# Patient Record
(demographics unavailable — no encounter records)

---

## 2024-10-14 NOTE — CARDIOLOGY SUMMARY
[de-identified] : 10/14/24 NSR, first degree AVB, LBBB 12/18/23 Sinus Rhythm @ 68 bpm, GUILLERMINA 244 ms, LBBB 8/7/2023 SR @ 72 bpm, first deg AVB, LBBB 10/17/22 low Atrial  Rhythm @ 65 bpm, LBBB  6/20/22 SR with first deg AVB, LBBB 12/13/21 SR with first degree AVB, LBBB [de-identified] : ZioXT 1/14 - 1/28/22: SR (49-), non sustained SVT. No AFib.  [de-identified] : TTE 9/10/24 1. Limited study obtained for evaluation of LVOT gradients.  2. Severe symmetric left ventricular hypertrophy.  3. Normal left ventricular systolic function.  4. Normal right ventricular size and systolic function.  5. Mild mitral regurgitation.  6. The resting LVOT gradient is 10.00 mmHg. The LVOT gradient is 17.00 mmHg with Valsalva maneuver. There is mild mitral regurgitation.  7. Pulmonary artery systolic pressure is 31 mmHg.  8. No pericardial effusion.  9. Compared to the previous TTE performed on 7/2/2024, the LVOT gradients are slightly higher.  TTE 4/2024  1. Normal left ventricular size and systolic function.  2. There is moderate asymmetric left ventricular hypertrophy.  3. Normal right ventricular size and systolic function.  4. Severely dilated left atrium.  5. Systolic anterior motion of the mitral valve is seen without evidence of LVOT obstruction.  6. Aortic sclerosis without significant stenosis.  7. No evidence of pulmonary hypertension.  8. No pericardial effusion.  9. Compared to the previous TTE performed on 11/17/2020, left ventricular outflow tract obstruction is now present on this study.  TTE 8/2023  Probably normal LV function, peak gradient 59 mmHg with valsalva, mild MR,    TTE 3/29/22  EF 60-65%, severe asymmetric LVH, cannot rule out LVOT obstruction versus AS, ascending aorta mildly dilated,  [de-identified] : CMR 4/20/21\par  1.  The left ventricle (LV) is dilated. There is  asymmetric septal hypertrophy. Basal anteroseptum measures 2.1 cm.  Left ventricular global systolic function is normal. The LV ejection fraction is 71 %.\par  2.  The right ventricle (RV) is dilated. RV global systolic function is normal. The RV ejection fraction is 66 %.\par  3.  There is systolic anterior motion of the mitral valve. There is increased signal intensity in the left ventricular outflow tract due to increased flow turbulence. Correlate gradients with echocardiography.\par  4.  Moderate to severe MR by phase contrast imaging. Regurgitant volume is 84 mL;  regurgitant fraction is 42% . There is mild mitral regurgitation by LV-RV stroke volume.\par  5.  No significant abnormalities of the visualized portions of the great vessels.\par  6.  On delayed enhancement imaging,  there is no evidence of myocardial scarring. [de-identified] : NM 9/2022: \par  No evidence of transthyretin mediated cardiac amyloidosis was identified.  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

## 2024-10-14 NOTE — DISCUSSION/SUMMARY
[FreeTextEntry1] : Mr. Posadas is a pleasant 70 year-old gentleman with a known LBBB, paroxysmal atrial fibrillation and asymmetric LVH. He is s/p PVI with the cryoballoon on 8/31/21.   1. AFib Maintaining SR post DCCV 3/2024 Continue Toprol XL for rate control  2. LBBB Stable.  Will continue to monitor   3. Stroke risk  Continue OAC for TE/CVA ppx  CHADS VASc at least 1   4. LVH No indication for ICD placement at this time  F/U 6 months, sooner as needed

## 2024-10-14 NOTE — CARDIOLOGY SUMMARY
[de-identified] : 10/14/24 NSR, first degree AVB, LBBB 12/18/23 Sinus Rhythm @ 68 bpm, GUILLERMINA 244 ms, LBBB 8/7/2023 SR @ 72 bpm, first deg AVB, LBBB 10/17/22 low Atrial  Rhythm @ 65 bpm, LBBB  6/20/22 SR with first deg AVB, LBBB 12/13/21 SR with first degree AVB, LBBB [de-identified] : ZioXT 1/14 - 1/28/22: SR (49-), non sustained SVT. No AFib.  [de-identified] : TTE 9/10/24 1. Limited study obtained for evaluation of LVOT gradients.  2. Severe symmetric left ventricular hypertrophy.  3. Normal left ventricular systolic function.  4. Normal right ventricular size and systolic function.  5. Mild mitral regurgitation.  6. The resting LVOT gradient is 10.00 mmHg. The LVOT gradient is 17.00 mmHg with Valsalva maneuver. There is mild mitral regurgitation.  7. Pulmonary artery systolic pressure is 31 mmHg.  8. No pericardial effusion.  9. Compared to the previous TTE performed on 7/2/2024, the LVOT gradients are slightly higher.  TTE 4/2024  1. Normal left ventricular size and systolic function.  2. There is moderate asymmetric left ventricular hypertrophy.  3. Normal right ventricular size and systolic function.  4. Severely dilated left atrium.  5. Systolic anterior motion of the mitral valve is seen without evidence of LVOT obstruction.  6. Aortic sclerosis without significant stenosis.  7. No evidence of pulmonary hypertension.  8. No pericardial effusion.  9. Compared to the previous TTE performed on 11/17/2020, left ventricular outflow tract obstruction is now present on this study.  TTE 8/2023  Probably normal LV function, peak gradient 59 mmHg with valsalva, mild MR,    TTE 3/29/22  EF 60-65%, severe asymmetric LVH, cannot rule out LVOT obstruction versus AS, ascending aorta mildly dilated,  [de-identified] : CMR 4/20/21\par  1.  The left ventricle (LV) is dilated. There is  asymmetric septal hypertrophy. Basal anteroseptum measures 2.1 cm.  Left ventricular global systolic function is normal. The LV ejection fraction is 71 %.\par  2.  The right ventricle (RV) is dilated. RV global systolic function is normal. The RV ejection fraction is 66 %.\par  3.  There is systolic anterior motion of the mitral valve. There is increased signal intensity in the left ventricular outflow tract due to increased flow turbulence. Correlate gradients with echocardiography.\par  4.  Moderate to severe MR by phase contrast imaging. Regurgitant volume is 84 mL;  regurgitant fraction is 42% . There is mild mitral regurgitation by LV-RV stroke volume.\par  5.  No significant abnormalities of the visualized portions of the great vessels.\par  6.  On delayed enhancement imaging,  there is no evidence of myocardial scarring. [de-identified] : NM 9/2022: \par  No evidence of transthyretin mediated cardiac amyloidosis was identified.  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

## 2024-10-14 NOTE — HISTORY OF PRESENT ILLNESS
[FreeTextEntry1] : Mr. Posadas is a pleasant 70 year-old gentleman with a past medical history significant for possible sleep apnea, HTN, known LBBB, paroxysmal atrial fibrillation (first diagnosed 8-10 years ago), and asymmetric LVH who presents for follow-up.  He was admitted to [facility] with symptomatic AFib with RVR 11/2020.   Echo showed WON and improved LVOT gradient.  He has been maintained on Metoprolol for rate control and Amiodarone was added for rhythm management.   Historically he has been unaware of arrhythmia events.  He is now s/p PVI with the cryoballoon on 8/31/21.    Amiodarone was discontinued in December 2021. LTM 11/2022 without recurrent AF.  He had recurrent symptomatic AF, now S/P DCCV to SR 11/17/23 and again on 3/22/24.  Interval history significant for diagnosis of NH Lymphoma.  S/P chemotherapy.  Had repeat CT last week and f/u at [facility] on Thursday.   He generally feels OK.  Denies CP, palpitations, dizziness, presyncope/syncope.  Tolerating Eliquis without bleeding issues.   Able to walk without difficulty.  Longstanding HOGUE with inclines is unchanged.  Cardiac MRI 3/2021 with no report of subaortic membrane

## 2024-10-14 NOTE — PHYSICAL EXAM
[Well Developed] : well developed [Well Nourished] : well nourished [No Acute Distress] : no acute distress [Normal Conjunctiva] : normal conjunctiva [Normal Venous Pressure] : normal venous pressure [No Carotid Bruit] : no carotid bruit [Normal S1, S2] : normal S1, S2 [No Murmur] : no murmur [No Rub] : no rub [No Gallop] : no gallop [5th Left ICS - MCL] : palpated at the 5th LICS in the midclavicular line [Normal] : normal [Normal Rate] : normal [Rhythm Regular] : regular [Normal S1] : normal S1 [Normal S2] : normal S2 [II] : a grade 2 [Clear Lung Fields] : clear lung fields [Good Air Entry] : good air entry [No Respiratory Distress] : no respiratory distress  [Soft] : abdomen soft [Non Tender] : non-tender [No Masses/organomegaly] : no masses/organomegaly [Normal Bowel Sounds] : normal bowel sounds [Normal Gait] : normal gait [No Edema] : no edema [No Cyanosis] : no cyanosis [No Clubbing] : no clubbing [No Varicosities] : no varicosities [No Rash] : no rash [No Skin Lesions] : no skin lesions [Moves all extremities] : moves all extremities [No Focal Deficits] : no focal deficits [Normal Speech] : normal speech [Alert and Oriented] : alert and oriented [Normal memory] : normal memory [General Appearance - Well Developed] : well developed [Normal Appearance] : normal appearance [Well Groomed] : well groomed [General Appearance - Well Nourished] : well nourished [No Deformities] : no deformities [General Appearance - In No Acute Distress] : no acute distress [Oriented To Time, Place, And Person] : oriented to person, place, and time [Affect] : the affect was normal [Mood] : the mood was normal [No Anxiety] : not feeling anxious

## 2024-10-14 NOTE — ADDENDUM
[FreeTextEntry1] : I, Steff Zazueta, am scribing for and the presence of Dr. Farias the following sections: HPI, PMH,Family/social history, ROS, Physical Exam, Assessment / Plan.   I, Adenike Farias, personally performed the services described in the documentation, reviewed the documentation recorded by the scribe in my presence and it accurately and completely records my words and actions.

## 2024-10-14 NOTE — HISTORY OF PRESENT ILLNESS
[FreeTextEntry1] : Mr. Posadas is a pleasant 70 year-old gentleman with a past medical history significant for possible sleep apnea, HTN, known LBBB, paroxysmal atrial fibrillation (first diagnosed 8-10 years ago), and asymmetric LVH who presents for follow-up.  He was admitted to [facility] with symptomatic AFib with RVR 11/2020.   Echo showed WON and improved LVOT gradient.  He has been maintained on Metoprolol for rate control and Amiodarone was added for rhythm management.   Historically he has been unaware of arrhythmia events.  He is now s/p PVI with the cryoballoon on 8/31/21.    Amiodarone was discontinued in December 2021. LTM 11/2022 without recurrent AF.  He had recurrent symptomatic AF, now S/P DCCV to SR 11/17/23 and again on 3/22/24.  Interval history significant for diagnosis of NH Lymphoma.  S/P chemotherapy.  Had repeat CT last week and f/u at [facility] on Thursday.   He generally feels OK.  Denies CP, palpitations, dizziness, presyncope/syncope.  Tolerating Eliquis without bleeding issues.   Able to walk without difficulty.  Longstanding HOGUE with inclines is unchanged.  Cardiac MRI 3/2021 with no report of subaortic membrane

## 2024-10-17 NOTE — ASSESSMENT
[FreeTextEntry1] : 70 year old male with past medical history significant for AFib, BPH, HLD, HTN, Hx of NH Lymphoma, SHAEN on CPAP.   Reports completing chemo therapy, follows with [facility].. Reports nightly compliance of CPAP - reaches 100% compliance nightly.  Has been on Trelegy, helps his breathing with all weather changes.  On exam, lungs are clear, no wheezing or rhonchi, in NAD.  Care plans discussed - will continue Trelegy (100mcg/62.5mcg/25 mcg) 1 puff Daily for management of SOB. Follow-up in - 3 months

## 2024-10-17 NOTE — HISTORY OF PRESENT ILLNESS
[TextBox_4] : Mr. GUSTABO ALBA is a 70 year old male with past medical history significant for AFib, BPH, HLD, HTN, Hx of NH Lymphoma

## 2024-11-07 NOTE — PHYSICAL EXAM
[Well Developed] : well developed [Well Nourished] : well nourished [No Acute Distress] : no acute distress [Obese] : obese [Normal Venous Pressure] : normal venous pressure [No Carotid Bruit] : no carotid bruit [Normal S1, S2] : normal S1, S2 [No Murmur] : no murmur [No Rub] : no rub [No Gallop] : no gallop [Clear Lung Fields] : clear lung fields [Good Air Entry] : good air entry [No Respiratory Distress] : no respiratory distress  [Normal Gait] : normal gait [No Edema] : no edema [No Cyanosis] : no cyanosis [No Clubbing] : no clubbing [Moves all extremities] : moves all extremities [No Focal Deficits] : no focal deficits [Normal Speech] : normal speech [Normal] : alert and oriented, normal memory [Alert and Oriented] : alert and oriented [Normal memory] : normal memory [Normal Oral Mucosa] : normal oral mucosa [No Oral Pallor] : no oral pallor [No Oral Cyanosis] : no oral cyanosis [Normal Jugular Venous A Waves Present] : normal jugular venous A waves present [Normal Jugular Venous V Waves Present] : normal jugular venous V waves present [No Jugular Venous Cool A Waves] : no jugular venous cool A waves [Heart Rate And Rhythm] : heart rate and rhythm were normal [Heart Sounds] : normal S1 and S2 [Murmurs] : no murmurs present [Respiration, Rhythm And Depth] : normal respiratory rhythm and effort [Exaggerated Use Of Accessory Muscles For Inspiration] : no accessory muscle use [Auscultation Breath Sounds / Voice Sounds] : lungs were clear to auscultation bilaterally [Abdomen Soft] : soft [Abdomen Tenderness] : non-tender [Abdomen Mass (___ Cm)] : no abdominal mass palpated [Nail Clubbing] : no clubbing of the fingernails [Cyanosis, Localized] : no localized cyanosis [Petechial Hemorrhages (___cm)] : no petechial hemorrhages [] : no ischemic changes

## 2024-11-07 NOTE — REVIEW OF SYSTEMS
[Feeling Fatigued] : feeling fatigued [Dyspnea on exertion] : dyspnea during exertion [Joint Pain] : joint pain

## 2024-11-12 NOTE — ASSESSMENT
[FreeTextEntry1] : 70 year old male with past medical history significant for AFib, BPH, HLD, HTN, Hx of NH Lymphoma, SHANE on CPAP.  Reports following with [facility] for Lymphoma, after recent blood work his team there has decided to do full workup to assess for possible recurrence of lymphoma.  . Reports nightly compliance of CPAP - reaches 100% compliance nightly.   Has been on Trelegy, helps his breathing with all weather changes. Recently suffering from increase and congestion and nasal discahrge.   On exam, lungs are clear, no wheezing or rhonchi, in NAD. No evidence of post nasal drip or pharyngitis. Care plans discussed - will continue Trelegy (100mcg/62.5mcg/25 mcg) 1 puff Daily for management of SOB, recommend Afrin/Nasal spray for nasal congestion. Follow-up in - 3 months

## 2024-11-13 NOTE — HISTORY OF PRESENT ILLNESS
[FreeTextEntry1] : RPA spots  [de-identified] : 70M PMHx Non-Hodgkin's lymphoma on chemotherapy last seen presents for  LV may 2024 for fbse  1. Paronychia on second fingernail of left hand. previously PO abx and recurrence, was using Triamcinolone and tacro on it. has not flared since LV 6 months ago 2. Psoriasis for 25+ years - using tacrolimus 0.1% ointment qhs as needed for underarms and groin. Has also used topical steroids in the past. No joint pains.  3. 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

## 2024-11-13 NOTE — PHYSICAL EXAM
[Alert] : alert [Oriented x 3] : ~L oriented x 3 [Well Nourished] : well nourished [Conjunctiva Non-injected] : conjunctiva non-injected [No Visual Lymphadenopathy] : no visual  lymphadenopathy [No Clubbing] : no clubbing [No Edema] : no edema [No Bromhidrosis] : no bromhidrosis [No Chromhidrosis] : no chromhidrosis [Declined] : declined [FreeTextEntry3] : Focused skin exam per pt preference stuck on brown papules R hand skin colored firm papule with stellate scar on L wrist pink gritty papule on the right antihelix and L temple pink well-demarcated scaly plaque on the left axilla and on the inner thighs  fingers clear today xerosis on lower legs

## 2024-11-13 NOTE — ASSESSMENT
[FreeTextEntry1] : #Psoriasis, scalp, axillae, groin - chronic; stable - Diagnosis, chronic nature, disease course, treatment options and goals of therapy discussed - cont tacrolimus 0.1% ointment to the affected areas twice a day, SED  #Actinic keratosis, R antihelix x1, L temple x 1 - Treated 2 lesion w/ LN2 X 2 cycles (round 2 on R helix) - The risks/benefits/alternatives of cryo-destruction was explained to the patient which include but are not limited to redness, pain, blistering, scar, discoloration of skin, and recurrence. The patient expressed understanding of these risks and agreed to the procedure. The procedure was well tolerated, without complication. We have discussed related skin care.   #Chronic paronychia, 2nd fingernail of L hand - Chronic; stable - Stable, tacro PRN   #Spots SK, R hand DF, L wrist - Reassured, benign  #Xerosis Cutis - Take warm water baths. Avoid hot showers - Moisturize whole body 2-3x daily with thick cream (rather than lotion). Examples include OTC La Roche Posay, Vanicream, Cetaphil, Cerave  TBSE due 5/2025

## 2024-11-13 NOTE — HISTORY OF PRESENT ILLNESS
[FreeTextEntry1] : RPA spots  [de-identified] : 70M PMHx Non-Hodgkin's lymphoma on chemotherapy last seen presents for  LV may 2024 for fbse  1. Paronychia on second fingernail of left hand. previously PO abx and recurrence, was using Triamcinolone and tacro on it. has not flared since LV 6 months ago 2. Psoriasis for 25+ years - using tacrolimus 0.1% ointment qhs as needed for underarms and groin. Has also used topical steroids in the past. No joint pains.  3. 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

## 2024-11-25 NOTE — PHYSICAL EXAM
[Well Developed] : well developed [Well Nourished] : well nourished [No Acute Distress] : no acute distress [Obese] : obese [Normal Venous Pressure] : normal venous pressure [No Carotid Bruit] : no carotid bruit [Normal S1, S2] : normal S1, S2 [No Murmur] : no murmur [No Rub] : no rub [No Gallop] : no gallop [Clear Lung Fields] : clear lung fields [Good Air Entry] : good air entry [No Respiratory Distress] : no respiratory distress  [Normal Gait] : normal gait [No Edema] : no edema [No Cyanosis] : no cyanosis [No Clubbing] : no clubbing [Moves all extremities] : moves all extremities [No Focal Deficits] : no focal deficits [Normal Speech] : normal speech [Normal] : alert and oriented, normal memory [Alert and Oriented] : alert and oriented [Normal memory] : normal memory [Normal Oral Mucosa] : normal oral mucosa [No Oral Pallor] : no oral pallor [No Oral Cyanosis] : no oral cyanosis [Normal Jugular Venous A Waves Present] : normal jugular venous A waves present [Normal Jugular Venous V Waves Present] : normal jugular venous V waves present [No Jugular Venous Cool A Waves] : no jugular venous cool A waves [Heart Rate And Rhythm] : heart rate and rhythm were normal [Heart Sounds] : normal S1 and S2 [Murmurs] : no murmurs present [Respiration, Rhythm And Depth] : normal respiratory rhythm and effort [Exaggerated Use Of Accessory Muscles For Inspiration] : no accessory muscle use [Auscultation Breath Sounds / Voice Sounds] : lungs were clear to auscultation bilaterally [Abdomen Soft] : soft [Abdomen Tenderness] : non-tender [Abdomen Mass (___ Cm)] : no abdominal mass palpated [Nail Clubbing] : no clubbing of the fingernails [Petechial Hemorrhages (___cm)] : no petechial hemorrhages [Cyanosis, Localized] : no localized cyanosis [] : no ischemic changes [FreeTextEntry1] : obese, nad

## 2024-11-25 NOTE — HISTORY OF PRESENT ILLNESS
[FreeTextEntry1] : GUSTABO ALBA is a 70 year y.o. M with medical history significant for SHANE on CPA, AF s/p PVI w/ cryoballoon (21- off Amiodarone since 2021), hypertrophic cardiomyopathy (basal anteroseptum 2.1 cm) with improved gradient (on mavacamten), NYHA II symptoms, he developed recurrent symptomatic AF and underwent DCCV (2023), remains on AC, LBBB, prediabetes. He is here for c/o 2 episodes of PAF during the past week.  He has been diagnosed with duodenal NH-Lymphoma (2024), he completed chemo.   He is s/p EGD, biopsy taken from the stomach mucosa and from duodenum. Results are normal.  No cardiac complications.  No new cardiac complaints at this time. He is in AF today.  He remains compensated. BP is stable.   He has chronic limited functional capacity.  He is on high dose BB.  He is followed by HCM specialist (Dr Diego). He has been on mavacamten with serial echo monitoring. LVOT gradient improved.  He recently had an EVA. Achieved 4.6 METS. Normal EF. No LVOT gradient.       Denies CP, SOB (at rest), palpitations, orthopnea, dizziness, syncope, LH  He can walk 8 blocks on flat surface and can climb 1 flight of stairs.  Sleeps with 1-2 pillows   He denies history of MI, CVA, DM.  Denies family history of premature ASCVD and /or SCD  Cardiac meds  Metoprolol XL 150mg am and 100 mg pm Eliquis 5 mg Atorvastatin 20 mg Camzyos 5 mg   DATA LABS (24): Hgb 14; Hct 42.7; Plt 220; Cre 0.62; K 4.2; LFTs wnl; eGR 103; ; ; LDL 85; Hdl 41; A1c 5.7;  LABS (3/4/24): A1C 6.1, K 4.4, CRE 0.51, LFT's wnl, eGFR 109, , , LDL 91, NON-, TSH 2.85, HGB 15.5, HCT 46.0. LABS (10/23/23): ; ; HDL 43; LDL 87; NonHDL 111; Cre 0.58; K 4.7; LFTs wnl; eGFR 106;  LABS (23): A1c 5.8; ; ; LDL 87; HDL 45; NonHDL 111; K 4.3; Cre 0.63; eGFR 103; LFTs wnl;  LABS (23): ; ; LDL 81; HDL 46; NonHDL 116; K 4.2; Cre 0.58; LFTs wnl; eGFR 106; A1c 6.1;   (11/10/22): Hgb 14.7; Hct 45.1; ESR 34; K 4.5; Cre 0.52; LFTs wnl; eGFR 110; CRP 15;  (10/10/22): proBNP 453.  (22): A1c 6.1 ; TSH 2.26; Cre 0.72; K 4.5; LFTs wnl; eGFR 100; Hgb 14.8; Hct 49.4 (22): K 3.9; Cre 0.64; eGFR 103; LFTs wnl  (3/20/22): ; ; LDL 87; HDL 42; NonHDL 111; K 4.1; Cre 0.63; LFTs wnl; eGFR 104; A1c 5.8  MPI (22): is normal. Overall LVEF is preserved w/o regional wma. EF 73%. EKG is nondiagnostic  NM Amyloid SPECT/CT (10/3/22): No evidence of transthyretin mediated cardiac amyloidosis was identified. Incidental note is made of degenerative disease in the lower thoracic spine.   CMR (21): Dilated LV. Asymmetric septal hypertrophy (2.1 cm). LVEF 71%. RVEF 66%. Mild AR. Anterior MV leaflet thickened. WON of MV. There is increased signal intensity c/w turbulent flow in the LVOT. No AS. Mild AR. Mod- severe MR. Regurg Vol 84 ml, Regurg Fraction 43%. Mild MR by LV-RV stroke volume. No MS. Dilated LA. LA Vol 92 ml. RA dilated. No significant abnormalities of the visualized portions of the great vessels. On delayed enhancement imaging. There is no evidence of myocardial scarring.   EVA (24): Ex 7:26 min to 4.6 METS. LVEF 55-60%. No LVOT gradient at rest or with exertion.   ECHO (24): Mod to severe symmetric LVH. LVEF 58%. Mildly dilated LA.  ECHO (): Nl LV size and function. EF 60%. There is moderate asymmetric LVH. Severely dilated LA. WON of the MV is seen w.o evidence of LVOT obstruction. aortic sclerosis w.o stenosis. Compared with prior TTE (20) the LVOT obstruction is now present on this study.  Echo: TTE 10/2023: no LVOT obstruction  ECHO (23): Mild concentric LVH, probably normal LV systolic function. Increased peak LVOT gradient (59 mmHg) noted with Valsalva likely due to mild systolic motion of the anterior mitral leaflet.   ECHO (23): Limited study. Severe symmetric LVH, Aortic sclerosis w/o significant AS. Chordal WON present. Mild MR. There is no evidence of LVOT obstruction. Peak LVOT gradient 7 mmHg at rest and 6 mmHg with valsalva both while supine and standing . IVSd 1.71 cm ; LVPW 1.68 cm.   ECHO (23) EF 65%. WON No significant increase in LVOT gradient. Grade IDD. LA is mildly dilated. Mild MR. Mild-Mod TR. Mild PAH.   ECHO (3/30/22): Technically difficult study. LVEF 60-65%. Severe asymmetric LVH (septal wall 1.9cm; LVPW 1.5cm). Cannot r/o LVOT obstruction vs AS. PV 3.3 m/s; AV MG 23 mmHg; AV PG 43 mmHg; DVI 0.63; TERRI 1.9 cm2. Mild MR. PASP 35 mmHg. Asc Ao is mildly dilated 4 cm.   ECHO : severe asymmetric septal hypertrophy with significant LVOT obstruction peak gradient of 101 mmHg across  the LVOT   ECG (24): AF at 98 bpm w axis -41, LBBB ECG (24): NSR at 68 bpm w left axis (-37), 1st degree AVB (Brannon 248 ms) and LBBB ECG (24): NSR at 79 bpm w left axis (-38), LBBB, LAFB , first degree AVB (Brannon 240 ms) ECG (24): NSR at 69 bpm w axis -31, 1st degree AVB (Brannon 248 ms) and LBBB ECG (23): NSR at 67 bpm w axis -35, 1st degree AVB (Brannon = 250ms) and LBBB ECG (23): NSR at 59 bpm w nl axis, 1st degree AVB (Brannon 254 ms) and LBBB ECG (22): NSR at 67 bpm with normal axis, 1st degree AVB (EI=628 ms) and LBBB EKG 2022 SR, 1st ABV Brannon 234, LBBB EKG 3/29/2022 Sinus rhythm, left bundle branch block EKG 2021 SR, LBBB EKG: 3/8/21 SR @ 55 bpm, LBBB  EKG 2021: SR, LBBB, 1st AVB ( Brannon 256 msec)  EC21 SR with first degree AVB, LBBB   Holter (10/12/23): Min 50 bpm, Avg 72 bpm, Max 102 bpm. NSR w NSVT x 1 (7 beats long), PAT (longest 6 beats) Holter x 14d (22): Min 49 bpm, Avg 64 bpm, max 124 bpm. Sinus rhythm, brief PSVT x 7 (longest was 6 beats)

## 2024-11-25 NOTE — HISTORY OF PRESENT ILLNESS
[FreeTextEntry1] : GUSTABO ALBA is a 70 year y.o. M with medical history significant for SHANE on CPA, AF s/p PVI w/ cryoballoon (21- off Amiodarone since 2021), hypertrophic cardiomyopathy (basal anteroseptum 2.1 cm) with improved gradient (on mavacamten), NYHA II symptoms, he developed recurrent symptomatic AF and underwent DCCV (2023), remains on AC, LBBB, prediabetes. He is here for c/o 2 episodes of PAF during the past week.  He has been diagnosed with duodenal NH-Lymphoma (2024), he completed chemo.   He is s/p EGD, biopsy taken from the stomach mucosa and from duodenum. Results are normal.  No cardiac complications.  No new cardiac complaints at this time. He is in AF today.  He remains compensated. BP is stable.   He has chronic limited functional capacity.  He is on high dose BB.  He is followed by HCM specialist (Dr Diego). He has been on mavacamten with serial echo monitoring. LVOT gradient improved.  He recently had an EVA. Achieved 4.6 METS. Normal EF. No LVOT gradient.       Denies CP, SOB (at rest), palpitations, orthopnea, dizziness, syncope, LH  He can walk 8 blocks on flat surface and can climb 1 flight of stairs.  Sleeps with 1-2 pillows   He denies history of MI, CVA, DM.  Denies family history of premature ASCVD and /or SCD  Cardiac meds  Metoprolol XL 150mg am and 100 mg pm Eliquis 5 mg Atorvastatin 20 mg Camzyos 5 mg   DATA LABS (24): Hgb 14; Hct 42.7; Plt 220; Cre 0.62; K 4.2; LFTs wnl; eGR 103; ; ; LDL 85; Hdl 41; A1c 5.7;  LABS (3/4/24): A1C 6.1, K 4.4, CRE 0.51, LFT's wnl, eGFR 109, , , LDL 91, NON-, TSH 2.85, HGB 15.5, HCT 46.0. LABS (10/23/23): ; ; HDL 43; LDL 87; NonHDL 111; Cre 0.58; K 4.7; LFTs wnl; eGFR 106;  LABS (23): A1c 5.8; ; ; LDL 87; HDL 45; NonHDL 111; K 4.3; Cre 0.63; eGFR 103; LFTs wnl;  LABS (23): ; ; LDL 81; HDL 46; NonHDL 116; K 4.2; Cre 0.58; LFTs wnl; eGFR 106; A1c 6.1;   (11/10/22): Hgb 14.7; Hct 45.1; ESR 34; K 4.5; Cre 0.52; LFTs wnl; eGFR 110; CRP 15;  (10/10/22): proBNP 453.  (22): A1c 6.1 ; TSH 2.26; Cre 0.72; K 4.5; LFTs wnl; eGFR 100; Hgb 14.8; Hct 49.4 (22): K 3.9; Cre 0.64; eGFR 103; LFTs wnl  (3/20/22): ; ; LDL 87; HDL 42; NonHDL 111; K 4.1; Cre 0.63; LFTs wnl; eGFR 104; A1c 5.8  MPI (22): is normal. Overall LVEF is preserved w/o regional wma. EF 73%. EKG is nondiagnostic  NM Amyloid SPECT/CT (10/3/22): No evidence of transthyretin mediated cardiac amyloidosis was identified. Incidental note is made of degenerative disease in the lower thoracic spine.   CMR (21): Dilated LV. Asymmetric septal hypertrophy (2.1 cm). LVEF 71%. RVEF 66%. Mild AR. Anterior MV leaflet thickened. WON of MV. There is increased signal intensity c/w turbulent flow in the LVOT. No AS. Mild AR. Mod- severe MR. Regurg Vol 84 ml, Regurg Fraction 43%. Mild MR by LV-RV stroke volume. No MS. Dilated LA. LA Vol 92 ml. RA dilated. No significant abnormalities of the visualized portions of the great vessels. On delayed enhancement imaging. There is no evidence of myocardial scarring.   EVA (24): Ex 7:26 min to 4.6 METS. LVEF 55-60%. No LVOT gradient at rest or with exertion.   ECHO (24): Mod to severe symmetric LVH. LVEF 58%. Mildly dilated LA.  ECHO (): Nl LV size and function. EF 60%. There is moderate asymmetric LVH. Severely dilated LA. WON of the MV is seen w.o evidence of LVOT obstruction. aortic sclerosis w.o stenosis. Compared with prior TTE (20) the LVOT obstruction is now present on this study.  Echo: TTE 10/2023: no LVOT obstruction  ECHO (23): Mild concentric LVH, probably normal LV systolic function. Increased peak LVOT gradient (59 mmHg) noted with Valsalva likely due to mild systolic motion of the anterior mitral leaflet.   ECHO (23): Limited study. Severe symmetric LVH, Aortic sclerosis w/o significant AS. Chordal WON present. Mild MR. There is no evidence of LVOT obstruction. Peak LVOT gradient 7 mmHg at rest and 6 mmHg with valsalva both while supine and standing . IVSd 1.71 cm ; LVPW 1.68 cm.   ECHO (23) EF 65%. WON No significant increase in LVOT gradient. Grade IDD. LA is mildly dilated. Mild MR. Mild-Mod TR. Mild PAH.   ECHO (3/30/22): Technically difficult study. LVEF 60-65%. Severe asymmetric LVH (septal wall 1.9cm; LVPW 1.5cm). Cannot r/o LVOT obstruction vs AS. PV 3.3 m/s; AV MG 23 mmHg; AV PG 43 mmHg; DVI 0.63; TERRI 1.9 cm2. Mild MR. PASP 35 mmHg. Asc Ao is mildly dilated 4 cm.   ECHO : severe asymmetric septal hypertrophy with significant LVOT obstruction peak gradient of 101 mmHg across  the LVOT   ECG (24): AF at 98 bpm w axis -41, LBBB ECG (24): NSR at 68 bpm w left axis (-37), 1st degree AVB (Brannon 248 ms) and LBBB ECG (24): NSR at 79 bpm w left axis (-38), LBBB, LAFB , first degree AVB (Brannon 240 ms) ECG (24): NSR at 69 bpm w axis -31, 1st degree AVB (Brannon 248 ms) and LBBB ECG (23): NSR at 67 bpm w axis -35, 1st degree AVB (Brannon = 250ms) and LBBB ECG (23): NSR at 59 bpm w nl axis, 1st degree AVB (Brannon 254 ms) and LBBB ECG (22): NSR at 67 bpm with normal axis, 1st degree AVB (OI=515 ms) and LBBB EKG 2022 SR, 1st ABV Brannon 234, LBBB EKG 3/29/2022 Sinus rhythm, left bundle branch block EKG 2021 SR, LBBB EKG: 3/8/21 SR @ 55 bpm, LBBB  EKG 2021: SR, LBBB, 1st AVB ( Brannon 256 msec)  EC21 SR with first degree AVB, LBBB   Holter (10/12/23): Min 50 bpm, Avg 72 bpm, Max 102 bpm. NSR w NSVT x 1 (7 beats long), PAT (longest 6 beats) Holter x 14d (22): Min 49 bpm, Avg 64 bpm, max 124 bpm. Sinus rhythm, brief PSVT x 7 (longest was 6 beats)

## 2024-11-25 NOTE — ASSESSMENT
[FreeTextEntry1] : 70 year-old M with a past medical history significant, HTN (controlled), known LBBB, paroxysmal atrial fibrillation status post ablation (8/31/21 - on AC), NYHA II and hypertrophic cardiomyopathy w WON and high LVOT gradient (on metoprolol and mavacamten), duodenal Non-Hodgkins Lymphoma s/p chemo. he is s/p EGD with biopsy that is normal. No cardiac complications. He is overall doing well. He is in his usual chronic state of health.   - toprol xl from 150 mg in am and 100mg in pm to 150mg BID - he will hold eliquis for 48 hr prior to procedure and resume as soon as possible  - HCM --> no indications for primary prevention ICD given lack of NSVT on Holter, no first degree family history of SCD, MWT < 3.0 cm, and no significant fibrosis on MRI - continue taking current cardiac meds (atorvastatin, eliquis, metoprolol, mavacamten). - recc to avoid dehydration - continue with regular echo monitoring. - He follows regularly with Dr. Diego  Patient advised to go to the nearest ED whenever any symptoms persist and/or worsens. Patient/Family/Caregiver verbalized complete understanding of these instructions.   I spent a total of 25 minutes with more than 50% spent on counseling and coordinating care.   RTO in 4 mo.

## 2024-11-25 NOTE — REVIEW OF SYSTEMS
[Feeling Fatigued] : feeling fatigued [Dyspnea on exertion] : dyspnea during exertion [Joint Pain] : joint pain [Fever] : no fever [Headache] : no headache [Chills] : no chills [SOB] : no shortness of breath [Chest Discomfort] : no chest discomfort [Lower Ext Edema] : no extremity edema [Leg Claudication] : no intermittent leg claudication [Palpitations] : no palpitations [Orthopnea] : no orthopnea [PND] : no PND [Syncope] : no syncope [Cough] : no cough [Wheezing] : no wheezing [Coughing Up Blood] : no hemoptysis [Dizziness] : no dizziness [Convulsions] : no convulsions [Limb Weakness (Paresis)] : no limb weakness (Paresis) [Confusion] : no confusion was observed [Under Stress] : not under stress [Easy Bleeding] : no tendency for easy bleeding

## 2024-11-25 NOTE — PHYSICAL EXAM
[Well Developed] : well developed [Well Nourished] : well nourished [No Acute Distress] : no acute distress [Obese] : obese [Normal Venous Pressure] : normal venous pressure [No Carotid Bruit] : no carotid bruit [Normal S1, S2] : normal S1, S2 [No Murmur] : no murmur [No Rub] : no rub [No Gallop] : no gallop [Clear Lung Fields] : clear lung fields [Good Air Entry] : good air entry [No Respiratory Distress] : no respiratory distress  [Normal Gait] : normal gait [No Edema] : no edema [No Cyanosis] : no cyanosis [No Clubbing] : no clubbing [Moves all extremities] : moves all extremities [No Focal Deficits] : no focal deficits [Normal Speech] : normal speech [Normal] : alert and oriented, normal memory [Alert and Oriented] : alert and oriented [Normal memory] : normal memory [Normal Oral Mucosa] : normal oral mucosa [No Oral Pallor] : no oral pallor [No Oral Cyanosis] : no oral cyanosis [Normal Jugular Venous A Waves Present] : normal jugular venous A waves present [Normal Jugular Venous V Waves Present] : normal jugular venous V waves present [No Jugular Venous Cool A Waves] : no jugular venous cool A waves [Heart Rate And Rhythm] : heart rate and rhythm were normal [Heart Sounds] : normal S1 and S2 [Murmurs] : no murmurs present [Respiration, Rhythm And Depth] : normal respiratory rhythm and effort [Exaggerated Use Of Accessory Muscles For Inspiration] : no accessory muscle use [Auscultation Breath Sounds / Voice Sounds] : lungs were clear to auscultation bilaterally [Abdomen Soft] : soft [Abdomen Tenderness] : non-tender [Nail Clubbing] : no clubbing of the fingernails [Abdomen Mass (___ Cm)] : no abdominal mass palpated [Petechial Hemorrhages (___cm)] : no petechial hemorrhages [Cyanosis, Localized] : no localized cyanosis [] : no ischemic changes [FreeTextEntry1] : obese, nad

## 2024-11-26 NOTE — HISTORY OF PRESENT ILLNESS
[FreeTextEntry1] : Referring: Dr. Beata Cavanaugh EP: Dr. Farias  Mr. Posadas is a 69 YO M with a history of hypertrophic obstructive cardiomyopathy, recurrent persistent AF s/p ablation 2021 and DCCV 11/2023 and 3/2024, HTN, and duodenal lymphoma on chemotherapy presenting to cardiomyopathy clinic for further management.  ========  He states he diagnosed with HCM in the 1990s in the setting of fatigue at [facility]. It became apparent in the [facility] system in 2020 in the setting of rapid AF where TTE revealed basal septal hypertrophy with LVOT obstruction. He spontaneously converted to NSR and underwent PVI ablation 8/2021. He developed worsening dyspnea on exertion with LVOT obstruction and referred to the cardiomyopathy clinic 10/2022 for further management. Denies lifetime history of syncope.  Due to persistent NYHA II symptoms on max traditional medical therapy he was started on mavacamten (Camzyos) on 5/2023 and doses have been titrated. He no longer has LVOT obstruction but does have persistent NYHA II symptoms. He developed recurrent symptomatic AF and underwent DCCV 11/2023 and again 3/2024. His AF recurred 11/2024   Of note, he had an EGD performed for GERD which revealed lymphoma of the duodenum and started on chemotherapy in April  ============  He presents today for followup. He still does note some dyspnea when going up an incline but can walk unlimited distances on a flat surface. Notes occasional dizziness when standing up quickly. Denies palpitations or chest pain. Denies dyspnea during household activities. Denies syncope.

## 2024-11-26 NOTE — DISCUSSION/SUMMARY
[FreeTextEntry1] : # Hypertrophic obstructive cardiomyopathy - continue mavacamten 5 mg daily, will continue with routine screening TTE as indicated (last 9/2024) - continue high dose metoprolol, dose per Dr. Cavanaugh (currently at 300 mg a day) - indications for septal reduction therapy: none given resolution of LVOT obstruction  - last Holter or device interrogation: 10/2023, will obtain 3 day Ziopatch today  - no indications for primary prevention ICD given lack of NSVT on Holter, no first degree family history of SCD, MWT < 3.0 cm, and no significant fibrosis on MRI - family screening and genetic testing: advised his brother be screened, previously referred to Dr. Chávez for genetic testing - advised against burst activity and competitive sports. advised increased hydration at home  # pAF s/p ablation 2021 and multiple cardioversions  - EKG today shows SR but has been paroxysmal since last DCCV. 72 hour Ziopatch ordered today will also quantify AF burden better  - Follows with Dr. Farias - On eliquis 5 mg BID  # HTN - will allow for some permissive HTN in setting of HCM  # GI lymphoma - receiving chemotherapy, reportedly with good prognosis  Return to clinic in 6 months

## 2024-11-26 NOTE — ASSESSMENT
[FreeTextEntry1] : 71 YO M with a history of hypertrophic obstructive cardiomyopathy, recurrent persistent AF s/p ablation 2021 and DCCV 11/2023 and 3/2024, HTN, and duodenal lymphoma on chemotherapy presenting to cardiomyopathy clinic for further management.  He has had improvement in symptoms and resolution of LVOT obstruction with mavacamten but still notes dyspnea when climbing an incline.

## 2024-11-26 NOTE — CARDIOLOGY SUMMARY
[de-identified] : EKG 9/1: SR with 1st degree AVB (), LBBB with  EKG 10/2022: SR, LBBB with  ms, left axis deviation   [de-identified] : TTE 10/2023: no LVOT obstruction  TTE 8/30/2023 (on Camzyos 2.5): LV 3.6 cm, IV septum 1.4 cm, LVEF 60-65%, LVOT gradient 59 mmHg with Valsalva   TTE 5/2023: LVEF 65%, WON with turbulence and while no LVOT gradient was measured suspect there is LVOTO  TTE 11/2022: LV 4.9 cm, LVEF 69%,  severe basal septal hypertrophy measuring 1.7 cm, LVEF hyperdynamic, + WON, LVOT obstruction with 45 mmHg gradient with Valsalva, mild MR, estimated PASP 38 mmHg  TTE 3/2022 (poor quality study due to echo machine): LV 4.9 cm, LVEF hyperdynamic, severe basal septal hypertrophy measuring 1.9 cm, + WON with turbulence across LVOT tract, minimal MR, peak gradient measured at 22 mmHg with CW Doppler but there is a Pedoff probe measurement of 51 mmHg that does appear late peaking so suspect this is LVOT  TTE 11/2020: LV 5.7 cm, LVEF 60-65%, severe asymmetric septal hypertrophy with peak gradient 101 mmHg   [de-identified] : \par  Cardiac MR 4/2021: max wall thickness 2.1 cm, LVEF 71%, + WON, mod-severe MR seen on phase contrast imaging but mild using stroke volumes, no LGE\par   [de-identified] : \par  NM amyloidosis: negative for TTR amyloid\par

## 2024-11-26 NOTE — HISTORY OF PRESENT ILLNESS
[FreeTextEntry1] : Referring: Dr. Beata Cavanaugh EP: Dr. Farias  Mr. Posadas is a 71 YO M with a history of hypertrophic obstructive cardiomyopathy, recurrent persistent AF s/p ablation 2021 and DCCV 11/2023 and 3/2024, HTN, and duodenal lymphoma on chemotherapy presenting to cardiomyopathy clinic for further management.  ========  He states he diagnosed with HCM in the 1990s in the setting of fatigue at [facility]. It became apparent in the [facility] system in 2020 in the setting of rapid AF where TTE revealed basal septal hypertrophy with LVOT obstruction. He spontaneously converted to NSR and underwent PVI ablation 8/2021. He developed worsening dyspnea on exertion with LVOT obstruction and referred to the cardiomyopathy clinic 10/2022 for further management. Denies lifetime history of syncope.  Due to persistent NYHA II symptoms on max traditional medical therapy he was started on mavacamten (Camzyos) on 5/2023 and doses have been titrated. He no longer has LVOT obstruction but does have persistent NYHA II symptoms. He developed recurrent symptomatic AF and underwent DCCV 11/2023 and again 3/2024. His AF recurred 11/2024   Of note, he had an EGD performed for GERD which revealed lymphoma of the duodenum and started on chemotherapy in April  ============  He presents today for followup. He still does note some dyspnea when going up an incline but can walk unlimited distances on a flat surface. Notes occasional dizziness when standing up quickly. Denies palpitations or chest pain. Denies dyspnea during household activities. Denies syncope.

## 2024-11-26 NOTE — CARDIOLOGY SUMMARY
[de-identified] : EKG 9/1: SR with 1st degree AVB (), LBBB with  EKG 10/2022: SR, LBBB with  ms, left axis deviation   [de-identified] : TTE 10/2023: no LVOT obstruction  TTE 8/30/2023 (on Camzyos 2.5): LV 3.6 cm, IV septum 1.4 cm, LVEF 60-65%, LVOT gradient 59 mmHg with Valsalva   TTE 5/2023: LVEF 65%, WON with turbulence and while no LVOT gradient was measured suspect there is LVOTO  TTE 11/2022: LV 4.9 cm, LVEF 69%,  severe basal septal hypertrophy measuring 1.7 cm, LVEF hyperdynamic, + WON, LVOT obstruction with 45 mmHg gradient with Valsalva, mild MR, estimated PASP 38 mmHg  TTE 3/2022 (poor quality study due to echo machine): LV 4.9 cm, LVEF hyperdynamic, severe basal septal hypertrophy measuring 1.9 cm, + WON with turbulence across LVOT tract, minimal MR, peak gradient measured at 22 mmHg with CW Doppler but there is a Pedoff probe measurement of 51 mmHg that does appear late peaking so suspect this is LVOT  TTE 11/2020: LV 5.7 cm, LVEF 60-65%, severe asymmetric septal hypertrophy with peak gradient 101 mmHg   [de-identified] : \par  Cardiac MR 4/2021: max wall thickness 2.1 cm, LVEF 71%, + WON, mod-severe MR seen on phase contrast imaging but mild using stroke volumes, no LGE\par   [de-identified] : \par  NM amyloidosis: negative for TTR amyloid\par

## 2024-11-26 NOTE — PHYSICAL EXAM
[Well Developed] : well developed [Well Nourished] : well nourished [Normal Venous Pressure] : normal venous pressure [Normal S1, S2] : normal S1, S2 [Clear Lung Fields] : clear lung fields [Good Air Entry] : good air entry [Soft] : abdomen soft [Normal Gait] : normal gait [No Edema] : no edema [Moves all extremities] : moves all extremities [No Focal Deficits] : no focal deficits [Alert and Oriented] : alert and oriented [Normal memory] : normal memory [de-identified] : no murmur at rest or with [id number] provocation  [de-identified] : Warm

## 2024-11-26 NOTE — PHYSICAL EXAM
[Well Developed] : well developed [Well Nourished] : well nourished [Normal Venous Pressure] : normal venous pressure [Normal S1, S2] : normal S1, S2 [Clear Lung Fields] : clear lung fields [Good Air Entry] : good air entry [Soft] : abdomen soft [Normal Gait] : normal gait [No Edema] : no edema [Moves all extremities] : moves all extremities [No Focal Deficits] : no focal deficits [Alert and Oriented] : alert and oriented [Normal memory] : normal memory [de-identified] : no murmur at rest or with [id number] provocation  [de-identified] : Warm

## 2024-12-18 NOTE — HISTORY OF PRESENT ILLNESS
[TextBox_4] : Mr. GUSTABO ALBA is a 70 year old male with past medical history significant for AFib, BPH, HLD, HTN, Hx of NH Lymphoma.

## 2024-12-18 NOTE — ASSESSMENT
[FreeTextEntry1] :  70 year old male with past medical history significant for AFib, BPH, HLD, HTN, Hx of NH Lymphoma, SHANE on CPAP. Reports following with [facility] for Lymphoma, after recent blood work his team there has decided to do full workup to assess for possible recurrence of lymphoma.  Reports nightly compliance of CPAP - reaches 100% compliance nightly. Has been on Trelegy, helps his breathing with all weather changes. Recently diagnosed with Influenza, is on Tamiflu. Concerned over possible PNA    On exam, lungs are clear, no wheezing or rhonchi, in NAD. Care plans discussed - will continue Trelegy (100mcg/62.5mcg/25 mcg) 1 puff Daily for management of SOB, CXR ordered to r/o PNA.  Follow-up in 6 months.

## 2024-12-30 NOTE — HISTORY OF PRESENT ILLNESS
[de-identified] : COMES FOR F/U AFTER 3 M ;SEEN BY [facility] 12/17 FOR URI SX ;UNABLE TO OBTAIN COUGH RX  COUGH IS LESS WIFE CONCERN ABOUT HIS GLUCOSE

## 2024-12-30 NOTE — PHYSICAL EXAM
[No Acute Distress] : no acute distress [Normal Sclera/Conjunctiva] : normal sclera/conjunctiva [Normal Outer Ear/Nose] : the outer ears and nose were normal in appearance [Normal] : no respiratory distress, lungs were clear to auscultation bilaterally and no accessory muscle use [Normal Rate] : normal rate  [Regular Rhythm] : with a regular rhythm [No Edema] : there was no peripheral edema [Soft] : abdomen soft [Non Tender] : non-tender [Normal Bowel Sounds] : normal bowel sounds [No Rash] : no rash [Coordination Grossly Intact] : coordination grossly intact [No Focal Deficits] : no focal deficits [Normal Affect] : the affect was normal

## 2024-12-30 NOTE — ASSESSMENT
[FreeTextEntry1] : 70 Y OLD MALE WITH URI SX FOR OVER 2 WEEKS = CXR NO ACUTE DIASEASE= PROMETHAZINE RX SENT BY [facility] 12/26  IFG ,DYSLIPIDEMIA = LABS  HYPERTHROPHIC CARDIOMYOPATHY = AS PER CARDIO

## 2025-01-16 NOTE — ASSESSMENT
[FreeTextEntry1] : 70 year-old M with a past medical history significant, prediabetes, HTN (controlled), known LBBB, paroxysmal atrial fibrillation status post ablation (8/31/21 - on AC), NYHA II and hypertrophic cardiomyopathy w WON and high LVOT gradient (on metoprolol and mavacamten), duodenal Non-Hodgkins Lymphoma s/p chemo. he is s/p EGD with biopsy that is normal. He is in his usual chronic state of health.    - I reviewed recent labs, ED visit notes, CTA head and neck report - I reviewed echo report - continue toprol xl from 150 mg in am and 100mg in pm to 150mg BID - HCM --> no indications for primary prevention ICD given lack of NSVT on Holter, no first degree family history of SCD, MWT < 3.0 cm, and no significant fibrosis on MRI - continue taking cardiac meds (atorvastatin, eliquis, metoprolol, mavacamten). - avoid strict HTN control in the setting of HCM. - recc to avoid dehydration - continue with regular echo monitoring. - He follows regularly with Dr. Diego  Patient advised to go to the nearest ED whenever any symptoms persist and/or worsens. Patient/Family/Caregiver verbalized complete understanding of these instructions.   I spent a total of 25 minutes with more than 50% spent on counseling and coordinating care.   RTO in 4 mo.

## 2025-01-16 NOTE — PHYSICAL EXAM
[Well Developed] : well developed [Well Nourished] : well nourished [No Acute Distress] : no acute distress [Obese] : obese [Normal Venous Pressure] : normal venous pressure [No Carotid Bruit] : no carotid bruit [Normal S1, S2] : normal S1, S2 [No Murmur] : no murmur [No Rub] : no rub [No Gallop] : no gallop [Clear Lung Fields] : clear lung fields [Good Air Entry] : good air entry [No Respiratory Distress] : no respiratory distress  [Normal Gait] : normal gait [No Edema] : no edema [No Cyanosis] : no cyanosis [No Clubbing] : no clubbing [Moves all extremities] : moves all extremities [No Focal Deficits] : no focal deficits [Normal Speech] : normal speech [Alert and Oriented] : alert and oriented [Normal memory] : normal memory [Normal Oral Mucosa] : normal oral mucosa [No Oral Pallor] : no oral pallor [No Oral Cyanosis] : no oral cyanosis [Normal Jugular Venous A Waves Present] : normal jugular venous A waves present [Normal Jugular Venous V Waves Present] : normal jugular venous V waves present [No Jugular Venous Cool A Waves] : no jugular venous cool A waves [Heart Rate And Rhythm] : heart rate and rhythm were normal [Heart Sounds] : normal S1 and S2 [Murmurs] : no murmurs present [Respiration, Rhythm And Depth] : normal respiratory rhythm and effort [Exaggerated Use Of Accessory Muscles For Inspiration] : no accessory muscle use [Auscultation Breath Sounds / Voice Sounds] : lungs were clear to auscultation bilaterally [Abdomen Soft] : soft [Abdomen Tenderness] : non-tender [Abdomen Mass (___ Cm)] : no abdominal mass palpated [Nail Clubbing] : no clubbing of the fingernails [Cyanosis, Localized] : no localized cyanosis [Petechial Hemorrhages (___cm)] : no petechial hemorrhages [] : no ischemic changes [FreeTextEntry1] : obese, nad

## 2025-01-16 NOTE — REVIEW OF SYSTEMS
[Feeling Fatigued] : feeling fatigued [Dyspnea on exertion] : dyspnea during exertion [Joint Pain] : joint pain [Fever] : no fever [Headache] : no headache [Chills] : no chills [SOB] : no shortness of breath [Chest Discomfort] : no chest discomfort [Lower Ext Edema] : no extremity edema [Leg Claudication] : no intermittent leg claudication [Palpitations] : no palpitations [Orthopnea] : no orthopnea [PND] : no PND [Syncope] : no syncope [Cough] : no cough [Wheezing] : no wheezing [Coughing Up Blood] : no hemoptysis [Convulsions] : no convulsions [Limb Weakness (Paresis)] : no limb weakness (Paresis) [Confusion] : no confusion was observed [Under Stress] : not under stress [Easy Bleeding] : no tendency for easy bleeding

## 2025-01-16 NOTE — HISTORY OF PRESENT ILLNESS
[FreeTextEntry1] : GUSTABO ALBA is a 70 year y.o. 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   He is here for follow-up post recent ED visit for complaints of dizziness and Headache. CTA Head and neck without acute findings. /74. HR 75.  He was not admitted. He was advised to follow-up as outpatient.  He has been back to his usual state of health. He has chronic decrease exercise capacity.   He is s/p EGD, biopsy taken from the stomach mucosa and from duodenum. Results are normal. No cardiac complications.  No new cardiac complaints at this time. BP is stable.   He has chronic limited functional capacity.  He is on high dose BB.  He is followed by HCM specialist (Dr Diego). He has been on mavacamten with serial echo monitoring. LVOT gradient improved.  He recently had an EVA. Achieved 4.6 METS. Normal EF. No LVOT gradient.       Denies CP, SOB (at rest), palpitations, orthopnea, dizziness, syncope, LH  He can walk 8 blocks on flat surface and can climb 1 flight of stairs.  Sleeps with 1-2 pillows   He denies history of MI, CVA, DM.  Denies family history of premature ASCVD and /or SCD  Cardiac meds  Metoprolol XL 150mg am and 150 mg pm Eliquis 5 mg Atorvastatin 20 mg Camzyos 5 mg   DATA (25): K 4.6; Cre 0.73; eGFR 98; LFTs wnl; Hgb 13.5; Hct 41.2; Plt 186;  (24): A1c 6.1; K 4.4; Cre 0.55; eGFR 107; LFTs wnl; ; TG 86; ; HDL 50; NonHDL 118  LABS (24): Hgb 14; Hct 42.7; Plt 220; Cre 0.62; K 4.2; LFTs wnl; eGR 103; ; ; LDL 85; Hdl 41; A1c 5.7;  LABS (3/4/24): A1C 6.1, K 4.4, CRE 0.51, LFT's wnl, eGFR 109, , , LDL 91, NON-, TSH 2.85, HGB 15.5, HCT 46.0. LABS (10/23/23): ; ; HDL 43; LDL 87; NonHDL 111; Cre 0.58; K 4.7; LFTs wnl; eGFR 106;  LABS (23): A1c 5.8; ; ; LDL 87; HDL 45; NonHDL 111; K 4.3; Cre 0.63; eGFR 103; LFTs wnl;  LABS (23): ; ; LDL 81; HDL 46; NonHDL 116; K 4.2; Cre 0.58; LFTs wnl; eGFR 106; A1c 6.1;   (11/10/22): Hgb 14.7; Hct 45.1; ESR 34; K 4.5; Cre 0.52; LFTs wnl; eGFR 110; CRP 15;  (10/10/22): proBNP 453.  (22): A1c 6.1 ; TSH 2.26; Cre 0.72; K 4.5; LFTs wnl; eGFR 100; Hgb 14.8; Hct 49.4 (22): K 3.9; Cre 0.64; eGFR 103; LFTs wnl  (3/20/22): ; ; LDL 87; HDL 42; NonHDL 111; K 4.1; Cre 0.63; LFTs wnl; eGFR 104; A1c 5.8  MPI (22): is normal. Overall LVEF is preserved w/o regional wma. EF 73%. EKG is nondiagnostic  NM Amyloid SPECT/CT (10/3/22): No evidence of transthyretin mediated cardiac amyloidosis was identified. Incidental note is made of degenerative disease in the lower thoracic spine.   CMR (21): Dilated LV. Asymmetric septal hypertrophy (2.1 cm). LVEF 71%. RVEF 66%. Mild AR. Anterior MV leaflet thickened. WON of MV. There is increased signal intensity c/w turbulent flow in the LVOT. No AS. Mild AR. Mod- severe MR. Regurg Vol 84 ml, Regurg Fraction 43%. Mild MR by LV-RV stroke volume. No MS. Dilated LA. LA Vol 92 ml. RA dilated. No significant abnormalities of the visualized portions of the great vessels. On delayed enhancement imaging. There is no evidence of myocardial scarring.   EVA (24): Ex 7:26 min to 4.6 METS. LVEF 55-60%. No LVOT gradient at rest or with exertion.   ECHO (24): Severe symmetric LVH. Normal LV size and function. EF 56%. LV GLS -13.7. Nl RV size and function. Mod LA dilatation. Mild MR.  ECHO (24): Mod to severe symmetric LVH. LVEF 58%. Mildly dilated LA.  ECHO (): Nl LV size and function. EF 60%. There is moderate asymmetric LVH. Severely dilated LA. WON of the MV is seen w.o evidence of LVOT obstruction. aortic sclerosis w.o stenosis. Compared with prior TTE (20) the LVOT obstruction is now present on this study.  Echo: TTE 10/2023: no LVOT obstruction  ECHO (23): Mild concentric LVH, probably normal LV systolic function. Increased peak LVOT gradient (59 mmHg) noted with Valsalva likely due to mild systolic motion of the anterior mitral leaflet.   ECHO (23): Limited study. Severe symmetric LVH, Aortic sclerosis w/o significant AS. Chordal WON present. Mild MR. There is no evidence of LVOT obstruction. Peak LVOT gradient 7 mmHg at rest and 6 mmHg with valsalva both while supine and standing . IVSd 1.71 cm ; LVPW 1.68 cm.   ECHO (23) EF 65%. WON No significant increase in LVOT gradient. Grade IDD. LA is mildly dilated. Mild MR. Mild-Mod TR. Mild PAH.   ECHO (3/30/22): Technically difficult study. LVEF 60-65%. Severe asymmetric LVH (septal wall 1.9cm; LVPW 1.5cm). Cannot r/o LVOT obstruction vs AS. PV 3.3 m/s; AV MG 23 mmHg; AV PG 43 mmHg; DVI 0.63; TERRI 1.9 cm2. Mild MR. PASP 35 mmHg. Asc Ao is mildly dilated 4 cm.   ECHO : severe asymmetric septal hypertrophy with significant LVOT obstruction peak gradient of 101 mmHg across the LVOT   ECG (24): AF at 98 bpm w axis -41, LBBB ECG (24): NSR at 68 bpm w left axis (-37), 1st degree AVB (Brannon 248 ms) and LBBB ECG (24): NSR at 79 bpm w left axis (-38), LBBB, LAFB , first degree AVB (Brannon 240 ms) ECG (24): NSR at 69 bpm w axis -31, 1st degree AVB (Brannon 248 ms) and LBBB ECG (23): NSR at 67 bpm w axis -35, 1st degree AVB (Brannon = 250ms) and LBBB ECG (23): NSR at 59 bpm w nl axis, 1st degree AVB (Brannon 254 ms) and LBBB ECG (22): NSR at 67 bpm with normal axis, 1st degree AVB (XM=906 ms) and LBBB EKG 2022 SR, 1st ABV Brannon 234, LBBB EKG 3/29/2022 Sinus rhythm, left bundle branch block EKG 2021 SR, LBBB EKG: 3/8/21 SR @ 55 bpm, LBBB  EKG 2021: SR, LBBB, 1st AVB ( Brannon 256 msec)  EC21 SR with first degree AVB, LBBB   Holter (10/12/23): Min 50 bpm, Avg 72 bpm, Max 102 bpm. NSR w NSVT x 1 (7 beats long), PAT (longest 6 beats) Holter x 14d (22): Min 49 bpm, Avg 64 bpm, max 124 bpm. Sinus rhythm, brief PSVT x 7 (longest was 6 beats)  CTA Head and neck (25): No acute ICH. No evidence of significant stenosis or occlusion b/l groundglass opacities of the visualized lungs. Dilated left main PA.

## 2025-02-27 NOTE — ASSESSMENT
[FreeTextEntry1] : 70 year old male with past medical history significant for AFib, BPH, HLD, HTN, Hx of NH Lymphoma, SHANE on CPAP. Reports following with [facility] for Lymphoma.  Reports nightly compliance of CPAP - reaches 100% compliance nightly. Has been on Trelegy, helps his breathing with all weather changes.  CXR - Cardiomegaly, mild bibasilar atelectasis or scarring - Dec 2024  On exam, lungs are clear, no wheezing or rhonchi, in NAD. Care plans discussed - will continue Trelegy (100mcg/62.5mcg/25 mcg) 1 puff Daily for management of SOB. Follow-up in 6 months.

## 2025-02-27 NOTE — HISTORY OF PRESENT ILLNESS
[TextBox_4] : 70 year old male with past medical history significant for AFib, BPH, HLD, HTN, Hx of NH Lymphoma.

## 2025-03-21 NOTE — PHYSICAL EXAM
[No Acute Distress] : no acute distress [Normal] : no respiratory distress, lungs were clear to auscultation bilaterally and no accessory muscle use [Normal S1, S2] : normal S1 and S2 [Coordination Grossly Intact] : coordination grossly intact [No Focal Deficits] : no focal deficits [Normal Affect] : the affect was normal [Normal Insight/Judgement] : insight and judgment were intact [de-identified] : tachycardia [de-identified] : BL ankle edema 1+ [de-identified] : lt index finger mild edema and erythema lateral nail fold

## 2025-03-21 NOTE — PLAN
[FreeTextEntry1] : paronychia- Bacitracin  Afib- continue Metoprolol 150mg bid and Eliquis 5mg bid. EKG LVH, Afib at 103. Advised member to FU with Cardio.  If symptoms of palpitations persist or worsen go to ER.

## 2025-03-21 NOTE — REVIEW OF SYSTEMS
[Palpitations] : palpitations [Negative] : Psychiatric [Chest Pain] : no chest pain [Claudication] : no  leg claudication [Lower Ext Edema] : no lower extremity edema

## 2025-03-21 NOTE — HISTORY OF PRESENT ILLNESS
[FreeTextEntry8] : 71 year old male with AFib, BPH, HLD, HTN, SHANE on CPAP,  Hx of NH Lymphoma presents with c/o feeling fatigue and palpitations.  He denies any SOB or chest pain.  He reports lifting heavy ladder this morning and noted symptoms subsequently.  In the past palpitations would revert back to regular but not this time.  He reports cardioversions and ablations in the past for Afib. He also c/o LT middle finger tenderness. labs from 12/2024 and Cardio notes reviewed.

## 2025-03-24 NOTE — PHYSICAL EXAM
[Well Developed] : well developed [Well Nourished] : well nourished [No Acute Distress] : no acute distress [Obese] : obese [Normal Venous Pressure] : normal venous pressure [No Carotid Bruit] : no carotid bruit [Normal S1, S2] : normal S1, S2 [No Murmur] : no murmur [No Rub] : no rub [No Gallop] : no gallop [Clear Lung Fields] : clear lung fields [Good Air Entry] : good air entry [No Respiratory Distress] : no respiratory distress  [Normal Gait] : normal gait [No Edema] : no edema [No Cyanosis] : no cyanosis [No Clubbing] : no clubbing [Moves all extremities] : moves all extremities [No Focal Deficits] : no focal deficits [Normal Speech] : normal speech [Alert and Oriented] : alert and oriented [Normal memory] : normal memory [Normal Oral Mucosa] : normal oral mucosa [No Oral Pallor] : no oral pallor [No Oral Cyanosis] : no oral cyanosis [Normal Jugular Venous A Waves Present] : normal jugular venous A waves present [Normal Jugular Venous V Waves Present] : normal jugular venous V waves present [No Jugular Venous Cool A Waves] : no jugular venous cool A waves [Heart Rate And Rhythm] : heart rate and rhythm were normal [Heart Sounds] : normal S1 and S2 [Murmurs] : no murmurs present [Respiration, Rhythm And Depth] : normal respiratory rhythm and effort [Exaggerated Use Of Accessory Muscles For Inspiration] : no accessory muscle use [Auscultation Breath Sounds / Voice Sounds] : lungs were clear to auscultation bilaterally [Abdomen Soft] : soft [Abdomen Tenderness] : non-tender [Abdomen Mass (___ Cm)] : no abdominal mass palpated [Nail Clubbing] : no clubbing of the fingernails [Cyanosis, Localized] : no localized cyanosis [Petechial Hemorrhages (___cm)] : no petechial hemorrhages [] : no ischemic changes

## 2025-03-24 NOTE — PHYSICAL EXAM
[No Acute Distress] : no acute distress [Normal Sclera/Conjunctiva] : normal sclera/conjunctiva [Normal Outer Ear/Nose] : the outer ears and nose were normal in appearance [No JVD] : no jugular venous distention [Normal Rate] : normal rate  [Regular Rhythm] : with a regular rhythm [Normal S1, S2] : normal S1 and S2 [___ +] : bilateral [unfilled]U+ pitting edema to the ankles [Obese] : obese [Normal] : normal [Soft, Nontender] : the abdomen was soft and nontender [No Mass] : no masses were palpated [No HSM] : no hepatosplenomegaly noted [No CVA Tenderness] : no CVA  tenderness [No Rash] : no rash [Coordination Grossly Intact] : coordination grossly intact [No Focal Deficits] : no focal deficits [Alert and Oriented x3] : oriented to person, place, and time

## 2025-03-24 NOTE — HISTORY OF PRESENT ILLNESS
[de-identified] : COMES FOR F/U  SEEN BY CARDIOLOGY EARLIER TODAY  CC OF RADHA LOWER EXTREMITY EDEMA ;UNABLE TO WEAR COMPRESSION SOCKS

## 2025-03-24 NOTE — ASSESSMENT
[FreeTextEntry1] : 71 Y OLD MALE WITH PAF/FLUTTER,IFG ,OBESITY ,HTN = LABS ORDERED DYSLIPIDEMIA = ATORVASTATIN 20 MG  RTO 3 M;WT LOSS RECOMM  LOWER EXTR MARION A= VASC EVAL

## 2025-03-30 NOTE — ASSESSMENT
[FreeTextEntry1] : 70 yo M with history of nephrolithiasis, stable chronic BPH, dysuria  - PVR = 12ml - UA, culture - Reviewed potential etiologies for dysuria and ways to manage  - Discussed possible etiologies for LUTS. Discussed ways to manage them including behavioral modifications such as adequate hydration, controlling constipation, restricting fluids in the evening - Discussed option of anticholinergics. Reviewed risks and benefits and pt does not want secondline therapies at this time - Renal US in 6 months   Patient is being seen today for evaluation and management of a chronic and longitudinal ongoing condition and I am of the primary treating physician.

## 2025-03-30 NOTE — PHYSICAL EXAM
[Chaperone Present] : A chaperone was present in the examining room during all aspects of the physical examination [Normal Appearance] : normal appearance [Well Groomed] : well groomed [General Appearance - In No Acute Distress] : no acute distress [Edema] : no peripheral edema [Exaggerated Use Of Accessory Muscles For Inspiration] : no accessory muscle use [Respiration, Rhythm And Depth] : normal respiratory rhythm and effort [Abdomen Soft] : soft [Abdomen Tenderness] : non-tender [Costovertebral Angle Tenderness] : no ~M costovertebral angle tenderness [Urethral Meatus] : meatus normal [Penis Abnormality] : normal circumcised penis [Urinary Bladder Findings] : the bladder was normal on palpation [Scrotum] : the scrotum was normal [Epididymis] : the epididymides were normal [Testes Tenderness] : no tenderness of the testes [Testes Mass (___cm)] : there were no testicular masses [Prostate Tenderness] : the prostate was not tender [No Prostate Nodules] : no prostate nodules [Prostate Size ___ gm] : prostate size [unfilled] gm [] : no rash [No Focal Deficits] : no focal deficits [Oriented To Time, Place, And Person] : oriented to person, place, and time [Affect] : the affect was normal [Mood] : the mood was normal [FreeTextEntry2] : JANESSA Adams

## 2025-03-30 NOTE — HISTORY OF PRESENT ILLNESS
[FreeTextEntry1] : 65 yo M referred for crystalluria. Pt with history of nephrolithaisis s/p ESWL about 20 yrs ago Last saw a urologist 2 years ago which is the last time he had an US. Per pt, was told it was a large stone but was asymptomatic so continue observation Denies any flank pain, gross hematuria, dysuria No history of significant LUTS A few weeks ago had a "tingling sensation" along his penile urethra for about 24 hrs which resolved Drinks about 64 ounces water, 2-3 cups of coffee, rarely tea and soda Voiding 4-5 hours, nocturia 0-1/night  12/18/19 Interval history: No issues since last visit Here to review recent CT stone hunt results  6/17/20 Interval history: Doing well with no flank pain, gross hematuria, dysuria Has noticed some nocturia and weak flow lately US today showed same lower pole stone  12/23/21 Interval history: Overall doing well since last visit US at last visit showed stable stone burden LUTS more bothersome - urinary frequency during the day, nocturia 1/night  1/9/23 Interval history: Most recent UA showed microhematuria and crystalluria Drinks about half gallon of water, 2-3 cups of coffee  3/14/24 Interval history: Tingling sensation at the tip of penis for the last 4-6 weeks Seems to occur when he has urgency and resolves after voiding no gross hematuria, no flank pain Does have urinary urgency and had episode of urge incontinence Still taking tamsulosin Recently diagnosed with non-hodgkin's lymphoma Pending PET scan and subsequent radiation  3/17/25 Interval history: noticed some penile tip dysuria no gross hematuria, no flank pain no fever Continues to have urinary frequency and urgency Drinks 4 cups of water, 1-2 cups of coffee (per wife, 4 cups)

## 2025-03-30 NOTE — PHYSICAL EXAM
[Chaperone Present] : A chaperone was present in the examining room during all aspects of the physical examination [Normal Appearance] : normal appearance [Well Groomed] : well groomed [General Appearance - In No Acute Distress] : no acute distress [Edema] : no peripheral edema [Respiration, Rhythm And Depth] : normal respiratory rhythm and effort [Exaggerated Use Of Accessory Muscles For Inspiration] : no accessory muscle use [Abdomen Soft] : soft [Abdomen Tenderness] : non-tender [Costovertebral Angle Tenderness] : no ~M costovertebral angle tenderness [Urethral Meatus] : meatus normal [Penis Abnormality] : normal circumcised penis [Urinary Bladder Findings] : the bladder was normal on palpation [Scrotum] : the scrotum was normal [Epididymis] : the epididymides were normal [Testes Tenderness] : no tenderness of the testes [Testes Mass (___cm)] : there were no testicular masses [Prostate Tenderness] : the prostate was not tender [No Prostate Nodules] : no prostate nodules [Prostate Size ___ gm] : prostate size [unfilled] gm [] : no rash [No Focal Deficits] : no focal deficits [Oriented To Time, Place, And Person] : oriented to person, place, and time [Affect] : the affect was normal [Mood] : the mood was normal [FreeTextEntry2] : JANESSA Adams

## 2025-04-14 NOTE — PHYSICAL EXAM
[Well Developed] : well developed [Well Nourished] : well nourished [No Acute Distress] : no acute distress [Normal Conjunctiva] : normal conjunctiva [Normal Venous Pressure] : normal venous pressure [No Carotid Bruit] : no carotid bruit [Normal S1, S2] : normal S1, S2 [No Murmur] : no murmur [No Rub] : no rub [No Gallop] : no gallop [5th Left ICS - MCL] : palpated at the 5th LICS in the midclavicular line [Normal] : normal [Normal Rate] : normal [Rhythm Regular] : regular [Normal S1] : normal S1 [Normal S2] : normal S2 [II] : a grade 2 [Clear Lung Fields] : clear lung fields [Good Air Entry] : good air entry [No Respiratory Distress] : no respiratory distress  [Soft] : abdomen soft [Non Tender] : non-tender [No Masses/organomegaly] : no masses/organomegaly [Normal Bowel Sounds] : normal bowel sounds [Normal Gait] : normal gait [No Edema] : no edema [No Cyanosis] : no cyanosis [No Clubbing] : no clubbing [No Varicosities] : no varicosities [No Rash] : no rash [No Skin Lesions] : no skin lesions [Moves all extremities] : moves all extremities [No Focal Deficits] : no focal deficits [Normal Speech] : normal speech [Alert and Oriented] : alert and oriented [Normal memory] : normal memory [Normal Appearance] : normal appearance [General Appearance - Well Developed] : well developed [Well Groomed] : well groomed [General Appearance - Well Nourished] : well nourished [No Deformities] : no deformities [General Appearance - In No Acute Distress] : no acute distress [Oriented To Time, Place, And Person] : oriented to person, place, and time [Affect] : the affect was normal [Mood] : the mood was normal [No Anxiety] : not feeling anxious

## 2025-04-22 NOTE — CARDIOLOGY SUMMARY
[___] : [unfilled] [de-identified] : 4/14/25: NSR with LBBB, first degree AVB 10/14/24 NSR, first degree AVB, LBBB 12/18/23 Sinus Rhythm @ 68 bpm, GUILLERMINA 244 ms, LBBB 8/7/2023 SR @ 72 bpm, first deg AVB, LBBB 10/17/22 low Atrial  Rhythm @ 65 bpm, LBBB  6/20/22 SR with first deg AVB, LBBB 12/13/21 SR with first degree AVB, LBBB [de-identified] : ZioXT 1/14 - 1/28/22: SR (49-), non sustained SVT. No AFib.  [de-identified] : TTE 9/10/24 1. Limited study obtained for evaluation of LVOT gradients.  2. Severe symmetric left ventricular hypertrophy.  3. Normal left ventricular systolic function.  4. Normal right ventricular size and systolic function.  5. Mild mitral regurgitation.  6. The resting LVOT gradient is 10.00 mmHg. The LVOT gradient is 17.00 mmHg with Valsalva maneuver. There is mild mitral regurgitation.  7. Pulmonary artery systolic pressure is 31 mmHg.  8. No pericardial effusion.  9. Compared to the previous TTE performed on 7/2/2024, the LVOT gradients are slightly higher.  TTE 4/2024  1. Normal left ventricular size and systolic function.  2. There is moderate asymmetric left ventricular hypertrophy.  3. Normal right ventricular size and systolic function.  4. Severely dilated left atrium.  5. Systolic anterior motion of the mitral valve is seen without evidence of LVOT obstruction.  6. Aortic sclerosis without significant stenosis.  7. No evidence of pulmonary hypertension.  8. No pericardial effusion.  9. Compared to the previous TTE performed on 11/17/2020, left ventricular outflow tract obstruction is now present on this study.  TTE 8/2023  Probably normal LV function, peak gradient 59 mmHg with valsalva, mild MR,    TTE 3/29/22  EF 60-65%, severe asymmetric LVH, cannot rule out LVOT obstruction versus AS, ascending aorta mildly dilated,  [de-identified] : CMR 4/20/21\par  1.  The left ventricle (LV) is dilated. There is  asymmetric septal hypertrophy. Basal anteroseptum measures 2.1 cm.  Left ventricular global systolic function is normal. The LV ejection fraction is 71 %.\par  2.  The right ventricle (RV) is dilated. RV global systolic function is normal. The RV ejection fraction is 66 %.\par  3.  There is systolic anterior motion of the mitral valve. There is increased signal intensity in the left ventricular outflow tract due to increased flow turbulence. Correlate gradients with echocardiography.\par  4.  Moderate to severe MR by phase contrast imaging. Regurgitant volume is 84 mL;  regurgitant fraction is 42% . There is mild mitral regurgitation by LV-RV stroke volume.\par  5.  No significant abnormalities of the visualized portions of the great vessels.\par  6.  On delayed enhancement imaging,  there is no evidence of myocardial scarring. [de-identified] : NM 9/2022: \par  No evidence of transthyretin mediated cardiac amyloidosis was identified.  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

## 2025-04-22 NOTE — ADDENDUM
[FreeTextEntry1] :   I, Ani Gill, am scribing for and the presence of Dr. Adenike Farias the following sections: HPI, PMH,Family/social history, ROS, Physical Exam, Assessment / Plan.  I, Adenike Farias, hereby attest that the medical record entry for this patient accurately reflects signatures/notations that I made on the Date of Service in my capacity as an Attending Physician when I treated/diagnosed the above patient. I do hereby attest that this information is true, accurate and complete to the best of my knowledge.  I was present for the entire visit and supervised the entire visit including assessing the patient, conducting exam and establishing the plan of care.  I personally performed the evaluation and management services and agree with the plan as outlined above.

## 2025-04-22 NOTE — CARDIOLOGY SUMMARY
[___] : [unfilled] [de-identified] : 4/14/25: NSR with LBBB, first degree AVB 10/14/24 NSR, first degree AVB, LBBB 12/18/23 Sinus Rhythm @ 68 bpm, GUILLERMINA 244 ms, LBBB 8/7/2023 SR @ 72 bpm, first deg AVB, LBBB 10/17/22 low Atrial  Rhythm @ 65 bpm, LBBB  6/20/22 SR with first deg AVB, LBBB 12/13/21 SR with first degree AVB, LBBB [de-identified] : ZioXT 1/14 - 1/28/22: SR (49-), non sustained SVT. No AFib.  [de-identified] : TTE 9/10/24 1. Limited study obtained for evaluation of LVOT gradients.  2. Severe symmetric left ventricular hypertrophy.  3. Normal left ventricular systolic function.  4. Normal right ventricular size and systolic function.  5. Mild mitral regurgitation.  6. The resting LVOT gradient is 10.00 mmHg. The LVOT gradient is 17.00 mmHg with Valsalva maneuver. There is mild mitral regurgitation.  7. Pulmonary artery systolic pressure is 31 mmHg.  8. No pericardial effusion.  9. Compared to the previous TTE performed on 7/2/2024, the LVOT gradients are slightly higher.  TTE 4/2024  1. Normal left ventricular size and systolic function.  2. There is moderate asymmetric left ventricular hypertrophy.  3. Normal right ventricular size and systolic function.  4. Severely dilated left atrium.  5. Systolic anterior motion of the mitral valve is seen without evidence of LVOT obstruction.  6. Aortic sclerosis without significant stenosis.  7. No evidence of pulmonary hypertension.  8. No pericardial effusion.  9. Compared to the previous TTE performed on 11/17/2020, left ventricular outflow tract obstruction is now present on this study.  TTE 8/2023  Probably normal LV function, peak gradient 59 mmHg with valsalva, mild MR,    TTE 3/29/22  EF 60-65%, severe asymmetric LVH, cannot rule out LVOT obstruction versus AS, ascending aorta mildly dilated,  [de-identified] : CMR 4/20/21\par  1.  The left ventricle (LV) is dilated. There is  asymmetric septal hypertrophy. Basal anteroseptum measures 2.1 cm.  Left ventricular global systolic function is normal. The LV ejection fraction is 71 %.\par  2.  The right ventricle (RV) is dilated. RV global systolic function is normal. The RV ejection fraction is 66 %.\par  3.  There is systolic anterior motion of the mitral valve. There is increased signal intensity in the left ventricular outflow tract due to increased flow turbulence. Correlate gradients with echocardiography.\par  4.  Moderate to severe MR by phase contrast imaging. Regurgitant volume is 84 mL;  regurgitant fraction is 42% . There is mild mitral regurgitation by LV-RV stroke volume.\par  5.  No significant abnormalities of the visualized portions of the great vessels.\par  6.  On delayed enhancement imaging,  there is no evidence of myocardial scarring. [de-identified] : NM 9/2022: \par  No evidence of transthyretin mediated cardiac amyloidosis was identified.  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

## 2025-04-22 NOTE — HISTORY OF PRESENT ILLNESS
[FreeTextEntry1] : Mr. Posadas is a pleasant 71 year-old gentleman with a past medical history significant for possible sleep apnea, HTN, known LBBB, paroxysmal atrial fibrillation (first diagnosed 8-10 years ago), and asymmetric LVH who presents for follow-up.  He was admitted to [facility] with symptomatic AFib with RVR 11/2020.   Echo showed WON and improved LVOT gradient.  He has been maintained on Metoprolol for rate control and Amiodarone was added for rhythm management.   Historically he has been unaware of arrhythmia events.  He is now s/p PVI with the cryoballoon on 8/31/21.  Amiodarone was discontinued in December 2021. LTM 11/2022 without recurrent AF.  He had recurrent symptomatic AF, now S/P DCCV to SR 11/17/23 and again on 3/22/24.  Interval history significant for diagnosis of NH Lymphoma.  S/P chemotherapy. He generally feels OK.  Denies CP, palpitations, dizziness, presyncope/syncope.  Tolerating Eliquis without bleeding issues.  Able to walk without difficulty.  Longstanding HOGUE with inclines is unchanged.  He states that he went into atrial flutter a few months ago. He states that he drank a lot of water and converted back to NSR.

## 2025-04-22 NOTE — HISTORY OF PRESENT ILLNESS
[FreeTextEntry1] : Mr. Posadas is a pleasant 71 year-old gentleman with a past medical history significant for possible sleep apnea, HTN, known LBBB, paroxysmal atrial fibrillation (first diagnosed 8-10 years ago), and asymmetric LVH who presents for follow-up.  He was admitted to [facility] with symptomatic AFib with RVR 11/2020.   Echo showed WON and improved LVOT gradient.  He has been maintained on Metoprolol for rate control and Amiodarone was added for rhythm management.   Historically he has been unaware of arrhythmia events.  He is now s/p PVI with the cryoballoon on 8/31/21.  Amiodarone was discontinued in December 2021. LTM 11/2022 without recurrent AF.  He had recurrent symptomatic AF, now S/P DCCV to SR 11/17/23 and again on 3/22/24.  Interval history significant for diagnosis of NH Lymphoma.  S/P chemotherapy. He generally feels OK.  Denies CP, palpitations, dizziness, presyncope/syncope.  Tolerating Eliquis without bleeding issues.  Able to walk without difficulty.  Longstanding HOGUE with inclines is unchanged.  He states that he went into atrial flutter a few months ago. He states that he drank a lot of water and converted back to NSR.

## 2025-04-22 NOTE — DISCUSSION/SUMMARY
[FreeTextEntry1] : Mr. Posadas is a pleasant 71 year-old gentleman with a known LBBB, paroxysmal atrial fibrillation and asymmetric LVH. He is s/p PVI with the cryoballoon on 8/31/21 and has recurrent afib since his ablation.   1. Paroxysmal Atrial Fibrillation Currently in NSR, but has had two episodes of atrial fibrillation in the past few months Continue metoprolol xl 150 mg BID with an extra metoprolol xl 25 mg daily  We discussed in detail the normal conduction system of the heart and what atrial fibrillation is.  We discussed the natural progression of this arrhythmia in the context of any existing comorbidities.  We also discussed the association between atrial fibrillation and thrombotic events / stroke.  We discussed lifestyle modifications including weight control, treatment of HTN, diabetes, and limiting alcohol.  We discussed the association between sleep apnea and if they in fact have sleep apnea the importance of treating this regardless of the treatment plan. We discussed treatment options for paroxysmal atrial fibrillation including a rate control strategy vs. rhythm control with antiarrhythmic medications or an ablation.  The patent was counseled on the fact that there is no cure for atrial fibrillation and that for long term control of atrial fibrillation a combination of strategies if often used.  Regardless of treatment options and maintenance of sinus rhythm, anticoagulation is still recommended based on CHADSVASC score.  Success in rhythm control management is best defined as improved quality of life, maintenance of sinus rhythm and reduced hospitalization since not all patients have symptoms to diagnose sub-clinical episodes.  We discussed that an ablation results in better long-term outcomes compared to medical therapy alone but repeat procedures and/or addition of medications may be required even after an ablation.  Results of ablation are better for paroxysmal patients compared to persistent patients, which are better than long-standing persistent patients.  Given that the patient is in paroxysmal atrial fibrillation currently we discussed that typical 3-5 year success rates (freedom from clinical atrial fibrillation) based on current literature was quoted to the patient at approximately 80%. After discussing this the patient would like to proceed with an ablation.  We discussed the procedure in detail including risks, benefits, and drawbacks of each option in detail.   We discussed the procedure in detail including risks, benefits, and alternatives.  I have quoted him a 1:700 chance of major complication related to the procedure.  Risks including, but not limited to; infection, anesthesia reaction, bleeding, pain, vascular injury, cardiac perforation, esophageal injury/fistula,  TE/CVA, phrenic nerve injury, arrhythmia recurrence, need for emergent surgery and death were discussed.  We also discussed that having recurrent arrhythmia for the first 90 days post ablation is not predictive of long-term success of the ablation.  He appeared to understand the whole discussion and verbalized that all his questions were answered to his satisfaction.  He was given pre procedure instructions and knows to call with any questions or concerns.  During this visit we reviewed outside medical records including event monitor, echo, MRI, office notes and care plan was discussed with patient / family as well as referring doctor. We will call patient to schedule his ablation in the next few weeks.   2. LBBB Stable.  Will continue to monitor   3. Stroke risk  Continue OAC for TE/CVA ppx  CHADS VASc at least 1   4. LVH No indication for ICD placement at this time  Patient to follow-up for afib ablation in the next month or so. He should call prior if he has any questions or concerns.  [EKG obtained to assist in diagnosis and management of assessed problem(s)] : EKG obtained to assist in diagnosis and management of assessed problem(s)

## 2025-04-22 NOTE — CARDIOLOGY SUMMARY
[___] : [unfilled] [de-identified] : 4/14/25: NSR with LBBB, first degree AVB 10/14/24 NSR, first degree AVB, LBBB 12/18/23 Sinus Rhythm @ 68 bpm, GUILLERMINA 244 ms, LBBB 8/7/2023 SR @ 72 bpm, first deg AVB, LBBB 10/17/22 low Atrial  Rhythm @ 65 bpm, LBBB  6/20/22 SR with first deg AVB, LBBB 12/13/21 SR with first degree AVB, LBBB [de-identified] : ZioXT 1/14 - 1/28/22: SR (49-), non sustained SVT. No AFib.  [de-identified] : TTE 9/10/24 1. Limited study obtained for evaluation of LVOT gradients.  2. Severe symmetric left ventricular hypertrophy.  3. Normal left ventricular systolic function.  4. Normal right ventricular size and systolic function.  5. Mild mitral regurgitation.  6. The resting LVOT gradient is 10.00 mmHg. The LVOT gradient is 17.00 mmHg with Valsalva maneuver. There is mild mitral regurgitation.  7. Pulmonary artery systolic pressure is 31 mmHg.  8. No pericardial effusion.  9. Compared to the previous TTE performed on 7/2/2024, the LVOT gradients are slightly higher.  TTE 4/2024  1. Normal left ventricular size and systolic function.  2. There is moderate asymmetric left ventricular hypertrophy.  3. Normal right ventricular size and systolic function.  4. Severely dilated left atrium.  5. Systolic anterior motion of the mitral valve is seen without evidence of LVOT obstruction.  6. Aortic sclerosis without significant stenosis.  7. No evidence of pulmonary hypertension.  8. No pericardial effusion.  9. Compared to the previous TTE performed on 11/17/2020, left ventricular outflow tract obstruction is now present on this study.  TTE 8/2023  Probably normal LV function, peak gradient 59 mmHg with valsalva, mild MR,    TTE 3/29/22  EF 60-65%, severe asymmetric LVH, cannot rule out LVOT obstruction versus AS, ascending aorta mildly dilated,  [de-identified] : CMR 4/20/21\par  1.  The left ventricle (LV) is dilated. There is  asymmetric septal hypertrophy. Basal anteroseptum measures 2.1 cm.  Left ventricular global systolic function is normal. The LV ejection fraction is 71 %.\par  2.  The right ventricle (RV) is dilated. RV global systolic function is normal. The RV ejection fraction is 66 %.\par  3.  There is systolic anterior motion of the mitral valve. There is increased signal intensity in the left ventricular outflow tract due to increased flow turbulence. Correlate gradients with echocardiography.\par  4.  Moderate to severe MR by phase contrast imaging. Regurgitant volume is 84 mL;  regurgitant fraction is 42% . There is mild mitral regurgitation by LV-RV stroke volume.\par  5.  No significant abnormalities of the visualized portions of the great vessels.\par  6.  On delayed enhancement imaging,  there is no evidence of myocardial scarring. [de-identified] : NM 9/2022: \par  No evidence of transthyretin mediated cardiac amyloidosis was identified.  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

## 2025-04-22 NOTE — HISTORY OF PRESENT ILLNESS
[FreeTextEntry1] : Mr. Posadas is a pleasant 71 year-old gentleman with a past medical history significant for possible sleep apnea, HTN, known LBBB, paroxysmal atrial fibrillation (first diagnosed 8-10 years ago), and asymmetric LVH who presents for follow-up.  He was admitted to [facility] with symptomatic AFib with RVR 11/2020.   Echo showed WON and improved LVOT gradient.  He has been maintained on Metoprolol for rate control and Amiodarone was added for rhythm management.   Historically he has been unaware of arrhythmia events.  He is now s/p PVI with the cryoballoon on 8/31/21.  Amiodarone was discontinued in December 2021. LTM 11/2022 without recurrent AF.  He had recurrent symptomatic AF, now S/P DCCV to SR 11/17/23 and again on 3/22/24.  Interval history significant for diagnosis of NH Lymphoma.  S/P chemotherapy. He generally feels OK.  Denies CP, palpitations, dizziness, presyncope/syncope.  Tolerating Eliquis without bleeding issues.  Able to walk without difficulty.  Longstanding HOGUE with inclines is unchanged.  He states that he went into atrial flutter a few months ago. He states that he drank a lot of water and converted back to NSR.

## 2025-05-16 NOTE — CARDIOLOGY SUMMARY
[___] : [unfilled] [de-identified] : 5/5/25: NSR @ 73 bpm with LBBB  4/14/25: NSR with LBBB, first degree AVB 10/14/24 NSR, first degree AVB, LBBB 12/18/23 Sinus Rhythm @ 68 bpm, GUILLERMINA 244 ms, LBBB 8/7/2023 SR @ 72 bpm, first deg AVB, LBBB 10/17/22 low Atrial  Rhythm @ 65 bpm, LBBB  6/20/22 SR with first deg AVB, LBBB 12/13/21 SR with first degree AVB, LBBB [de-identified] : ZioXT 1/14 - 1/28/22: SR (49-), non sustained SVT. No AFib.  [de-identified] : TTE 9/10/24 1. Limited study obtained for evaluation of LVOT gradients.  2. Severe symmetric left ventricular hypertrophy.  3. Normal left ventricular systolic function.  4. Normal right ventricular size and systolic function.  5. Mild mitral regurgitation.  6. The resting LVOT gradient is 10.00 mmHg. The LVOT gradient is 17.00 mmHg with Valsalva maneuver. There is mild mitral regurgitation.  7. Pulmonary artery systolic pressure is 31 mmHg.  8. No pericardial effusion.  9. Compared to the previous TTE performed on 7/2/2024, the LVOT gradients are slightly higher.  TTE 4/2024  1. Normal left ventricular size and systolic function.  2. There is moderate asymmetric left ventricular hypertrophy.  3. Normal right ventricular size and systolic function.  4. Severely dilated left atrium.  5. Systolic anterior motion of the mitral valve is seen without evidence of LVOT obstruction.  6. Aortic sclerosis without significant stenosis.  7. No evidence of pulmonary hypertension.  8. No pericardial effusion.  9. Compared to the previous TTE performed on 11/17/2020, left ventricular outflow tract obstruction is now present on this study.  TTE 8/2023  Probably normal LV function, peak gradient 59 mmHg with valsalva, mild MR,    TTE 3/29/22  EF 60-65%, severe asymmetric LVH, cannot rule out LVOT obstruction versus AS, ascending aorta mildly dilated,  [de-identified] : CMR 4/20/21\par  1.  The left ventricle (LV) is dilated. There is  asymmetric septal hypertrophy. Basal anteroseptum measures 2.1 cm.  Left ventricular global systolic function is normal. The LV ejection fraction is 71 %.\par  2.  The right ventricle (RV) is dilated. RV global systolic function is normal. The RV ejection fraction is 66 %.\par  3.  There is systolic anterior motion of the mitral valve. There is increased signal intensity in the left ventricular outflow tract due to increased flow turbulence. Correlate gradients with echocardiography.\par  4.  Moderate to severe MR by phase contrast imaging. Regurgitant volume is 84 mL;  regurgitant fraction is 42% . There is mild mitral regurgitation by LV-RV stroke volume.\par  5.  No significant abnormalities of the visualized portions of the great vessels.\par  6.  On delayed enhancement imaging,  there is no evidence of myocardial scarring. [de-identified] : NM 9/2022: \par  No evidence of transthyretin mediated cardiac amyloidosis was identified.  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

## 2025-05-16 NOTE — HISTORY OF PRESENT ILLNESS
[FreeTextEntry1] : Mr. Posadas is a pleasant 71 year-old gentleman with a past medical history significant for possible sleep apnea, HTN, known LBBB, paroxysmal atrial fibrillation (first diagnosed 8-10 years ago), and asymmetric LVH who presents for follow-up.  He was admitted to [facility] with symptomatic AFib with RVR 11/2020.   Echo showed WON and improved LVOT gradient.  He has been maintained on Metoprolol for rate control and Amiodarone was added for rhythm management.   Historically he has been unaware of arrhythmia events.  He is now s/p PVI with the cryoballoon on 8/31/21.  Amiodarone was discontinued in December 2021. LTM 11/2022 without recurrent AF.  He had recurrent symptomatic AF, now S/P DCCV to SR 11/17/23 and again on 3/22/24. He is s/p ablation on 05/01/25. He states that he has a lot of bruising on his groin site, but has been feeling well overall.   Interval history significant for diagnosis of NH Lymphoma.  S/P chemotherapy. He generally feels OK.  Denies CP, palpitations, dizziness, presyncope/syncope.  Tolerating Eliquis without bleeding issues.  Able to walk without difficulty.  Longstanding HOGUE with inclines is unchanged.

## 2025-05-16 NOTE — DISCUSSION/SUMMARY
[FreeTextEntry1] : Mr. Posadas is a pleasant 71 year-old gentleman with a known LBBB, paroxysmal atrial fibrillation and asymmetric LVH. He is s/p PVI with the cryoballoon on 8/31/21 and has recurrent afib since his ablation. He had an ablation on 05/1/25.    1. Paroxysmal Atrial Fibrillation Currently in NSR. He is s/p ablation 5/1/25.  Continue metoprolol xl 150 mg BID with an extra metoprolol xl 25 mg daily  2. LBBB Stable.  Will continue to monitor   3. Stroke risk  Continue OAC for TE/CVA ppx  CHADS VASc at least 1   4. LVH No indication for ICD placement at this time  Patient to follow-up in 3 months or sooner if he has any questions or complaints.  [EKG obtained to assist in diagnosis and management of assessed problem(s)] : EKG obtained to assist in diagnosis and management of assessed problem(s)

## 2025-05-16 NOTE — DISCUSSION/SUMMARY
It was my pleasure to meet with you today. Please contact me with any questions or concerns, and please notify myself or our manager if there is anyway we can improve our service in your health care needs.  Below I have listed some instructions and information that pertain to today's visit.    -You have been advised to continue all current medication, otherwise not discussed in today's visit  -New medications and refills will been sent and made available at pharmacy or mail away  -Heathy daily diet to include low salt, low fat heart healthy and low carbohydrate, low sugar diabetic diet  -Drink 6-8 glasses of water daily  -Complete  fasting (8-12 hours - water, black coffee, plain tea ok) labs and/any other testing ordered prior to next scheduled followup [FreeTextEntry1] : Mr. Posadas is a pleasant 71 year-old gentleman with a known LBBB, paroxysmal atrial fibrillation and asymmetric LVH. He is s/p PVI with the cryoballoon on 8/31/21 and has recurrent afib since his ablation. He had an ablation on 05/1/25.    1. Paroxysmal Atrial Fibrillation Currently in NSR. He is s/p ablation 5/1/25.  Continue metoprolol xl 150 mg BID with an extra metoprolol xl 25 mg daily  2. LBBB Stable.  Will continue to monitor   3. Stroke risk  Continue OAC for TE/CVA ppx  CHADS VASc at least 1   4. LVH No indication for ICD placement at this time  Patient to follow-up in 3 months or sooner if he has any questions or complaints.  [EKG obtained to assist in diagnosis and management of assessed problem(s)] : EKG obtained to assist in diagnosis and management of assessed problem(s)

## 2025-05-16 NOTE — CARDIOLOGY SUMMARY
[___] : [unfilled] [de-identified] : 5/5/25: NSR @ 73 bpm with LBBB  4/14/25: NSR with LBBB, first degree AVB 10/14/24 NSR, first degree AVB, LBBB 12/18/23 Sinus Rhythm @ 68 bpm, GUILLERMINA 244 ms, LBBB 8/7/2023 SR @ 72 bpm, first deg AVB, LBBB 10/17/22 low Atrial  Rhythm @ 65 bpm, LBBB  6/20/22 SR with first deg AVB, LBBB 12/13/21 SR with first degree AVB, LBBB [de-identified] : ZioXT 1/14 - 1/28/22: SR (49-), non sustained SVT. No AFib.  [de-identified] : TTE 9/10/24 1. Limited study obtained for evaluation of LVOT gradients.  2. Severe symmetric left ventricular hypertrophy.  3. Normal left ventricular systolic function.  4. Normal right ventricular size and systolic function.  5. Mild mitral regurgitation.  6. The resting LVOT gradient is 10.00 mmHg. The LVOT gradient is 17.00 mmHg with Valsalva maneuver. There is mild mitral regurgitation.  7. Pulmonary artery systolic pressure is 31 mmHg.  8. No pericardial effusion.  9. Compared to the previous TTE performed on 7/2/2024, the LVOT gradients are slightly higher.  TTE 4/2024  1. Normal left ventricular size and systolic function.  2. There is moderate asymmetric left ventricular hypertrophy.  3. Normal right ventricular size and systolic function.  4. Severely dilated left atrium.  5. Systolic anterior motion of the mitral valve is seen without evidence of LVOT obstruction.  6. Aortic sclerosis without significant stenosis.  7. No evidence of pulmonary hypertension.  8. No pericardial effusion.  9. Compared to the previous TTE performed on 11/17/2020, left ventricular outflow tract obstruction is now present on this study.  TTE 8/2023  Probably normal LV function, peak gradient 59 mmHg with valsalva, mild MR,    TTE 3/29/22  EF 60-65%, severe asymmetric LVH, cannot rule out LVOT obstruction versus AS, ascending aorta mildly dilated,  [de-identified] : CMR 4/20/21\par  1.  The left ventricle (LV) is dilated. There is  asymmetric septal hypertrophy. Basal anteroseptum measures 2.1 cm.  Left ventricular global systolic function is normal. The LV ejection fraction is 71 %.\par  2.  The right ventricle (RV) is dilated. RV global systolic function is normal. The RV ejection fraction is 66 %.\par  3.  There is systolic anterior motion of the mitral valve. There is increased signal intensity in the left ventricular outflow tract due to increased flow turbulence. Correlate gradients with echocardiography.\par  4.  Moderate to severe MR by phase contrast imaging. Regurgitant volume is 84 mL;  regurgitant fraction is 42% . There is mild mitral regurgitation by LV-RV stroke volume.\par  5.  No significant abnormalities of the visualized portions of the great vessels.\par  6.  On delayed enhancement imaging,  there is no evidence of myocardial scarring. [de-identified] : NM 9/2022: \par  No evidence of transthyretin mediated cardiac amyloidosis was identified.  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

## 2025-05-16 NOTE — CARDIOLOGY SUMMARY
[___] : [unfilled] [de-identified] : 5/5/25: NSR @ 73 bpm with LBBB  4/14/25: NSR with LBBB, first degree AVB 10/14/24 NSR, first degree AVB, LBBB 12/18/23 Sinus Rhythm @ 68 bpm, GUILLERMINA 244 ms, LBBB 8/7/2023 SR @ 72 bpm, first deg AVB, LBBB 10/17/22 low Atrial  Rhythm @ 65 bpm, LBBB  6/20/22 SR with first deg AVB, LBBB 12/13/21 SR with first degree AVB, LBBB [de-identified] : ZioXT 1/14 - 1/28/22: SR (49-), non sustained SVT. No AFib.  [de-identified] : TTE 9/10/24 1. Limited study obtained for evaluation of LVOT gradients.  2. Severe symmetric left ventricular hypertrophy.  3. Normal left ventricular systolic function.  4. Normal right ventricular size and systolic function.  5. Mild mitral regurgitation.  6. The resting LVOT gradient is 10.00 mmHg. The LVOT gradient is 17.00 mmHg with Valsalva maneuver. There is mild mitral regurgitation.  7. Pulmonary artery systolic pressure is 31 mmHg.  8. No pericardial effusion.  9. Compared to the previous TTE performed on 7/2/2024, the LVOT gradients are slightly higher.  TTE 4/2024  1. Normal left ventricular size and systolic function.  2. There is moderate asymmetric left ventricular hypertrophy.  3. Normal right ventricular size and systolic function.  4. Severely dilated left atrium.  5. Systolic anterior motion of the mitral valve is seen without evidence of LVOT obstruction.  6. Aortic sclerosis without significant stenosis.  7. No evidence of pulmonary hypertension.  8. No pericardial effusion.  9. Compared to the previous TTE performed on 11/17/2020, left ventricular outflow tract obstruction is now present on this study.  TTE 8/2023  Probably normal LV function, peak gradient 59 mmHg with valsalva, mild MR,    TTE 3/29/22  EF 60-65%, severe asymmetric LVH, cannot rule out LVOT obstruction versus AS, ascending aorta mildly dilated,  [de-identified] : CMR 4/20/21\par  1.  The left ventricle (LV) is dilated. There is  asymmetric septal hypertrophy. Basal anteroseptum measures 2.1 cm.  Left ventricular global systolic function is normal. The LV ejection fraction is 71 %.\par  2.  The right ventricle (RV) is dilated. RV global systolic function is normal. The RV ejection fraction is 66 %.\par  3.  There is systolic anterior motion of the mitral valve. There is increased signal intensity in the left ventricular outflow tract due to increased flow turbulence. Correlate gradients with echocardiography.\par  4.  Moderate to severe MR by phase contrast imaging. Regurgitant volume is 84 mL;  regurgitant fraction is 42% . There is mild mitral regurgitation by LV-RV stroke volume.\par  5.  No significant abnormalities of the visualized portions of the great vessels.\par  6.  On delayed enhancement imaging,  there is no evidence of myocardial scarring. [de-identified] : NM 9/2022: \par  No evidence of transthyretin mediated cardiac amyloidosis was identified.  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

## 2025-05-16 NOTE — HISTORY OF PRESENT ILLNESS
[FreeTextEntry1] : Mr. Posadas is a pleasant 71 year-old gentleman with a past medical history significant for possible sleep apnea, HTN, known LBBB, paroxysmal atrial fibrillation (first diagnosed 8-10 years ago), and asymmetric LVH who presents for follow-up.  He was admitted to [facility] with symptomatic AFib with RVR 11/2020.   Echo showed WON and improved LVOT gradient.  He has been maintained on Metoprolol for rate control and Amiodarone was added for rhythm management.   Historically he has been unaware of arrhythmia events.  He is now s/p PVI with the cryoballoon on 8/31/21.  Amiodarone was discontinued in December 2021. LTM 11/2022 without recurrent AF.  He had recurrent symptomatic AF, now S/P DCCV to SR 11/17/23 and again on 3/22/24. He is s/p ablation on 05/01/25. He states that he has a lot of bruising on his groin site, but has been feeling well overall.   Interval history significant for diagnosis of NH Lymphoma.  S/P chemotherapy. He generally feels OK.  Denies CP, palpitations, dizziness, presyncope/syncope.  Tolerating Eliquis without bleeding issues.  Able to walk without difficulty.  Longstanding HOGUE with inclines is unchanged.

## 2025-05-16 NOTE — HISTORY OF PRESENT ILLNESS
[FreeTextEntry1] : Mr. Posadas is a pleasant 71 year-old gentleman with a past medical history significant for possible sleep apnea, HTN, known LBBB, paroxysmal atrial fibrillation (first diagnosed 8-10 years ago), and asymmetric LVH who presents for follow-up.  He was admitted to [facility] with symptomatic AFib with RVR 11/2020.   Echo showed WON and improved LVOT gradient.  He has been maintained on Metoprolol for rate control and Amiodarone was added for rhythm management.   Historically he has been unaware of arrhythmia events.  He is now s/p PVI with the cryoballoon on 8/31/21.  Amiodarone was discontinued in December 2021. LTM 11/2022 without recurrent AF.  He had recurrent symptomatic AF, now S/P DCCV to SR 11/17/23 and again on 3/22/24. He is s/p ablation on 05/01/25. He states that he has a lot of bruising on his groin site, but has been feeling well overall.   Interval history significant for diagnosis of NH Lymphoma.  S/P chemotherapy. He generally feels OK.  Denies CP, palpitations, dizziness, presyncope/syncope.  Tolerating Eliquis without bleeding issues.  Able to walk without difficulty.  Longstanding HOGUE with inclines is unchanged.

## 2025-05-27 NOTE — CARDIOLOGY SUMMARY
[de-identified] : EKG 9/1: SR with 1st degree AVB (), LBBB with  EKG 10/2022: SR, LBBB with  ms, left axis deviation   [de-identified] : TTE 3/2025: no obstruction  TTE 8/30/2023 (on Camzyos 2.5): LV 3.6 cm, IV septum 1.4 cm, LVEF 60-65%, LVOT gradient 59 mmHg with Valsalva   TTE 5/2023: LVEF 65%, WON with turbulence and while no LVOT gradient was measured suspect there is LVOTO  TTE 11/2022: LV 4.9 cm, LVEF 69%,  severe basal septal hypertrophy measuring 1.7 cm, LVEF hyperdynamic, + WON, LVOT obstruction with 45 mmHg gradient with Valsalva, mild MR, estimated PASP 38 mmHg  TTE 3/2022 (poor quality study due to echo machine): LV 4.9 cm, LVEF hyperdynamic, severe basal septal hypertrophy measuring 1.9 cm, + WON with turbulence across LVOT tract, minimal MR, peak gradient measured at 22 mmHg with CW Doppler but there is a Pedoff probe measurement of 51 mmHg that does appear late peaking so suspect this is LVOT  TTE 11/2020: LV 5.7 cm, LVEF 60-65%, severe asymmetric septal hypertrophy with peak gradient 101 mmHg   [de-identified] : \par  Cardiac MR 4/2021: max wall thickness 2.1 cm, LVEF 71%, + WON, mod-severe MR seen on phase contrast imaging but mild using stroke volumes, no LGE\par   [de-identified] : \par  NM amyloidosis: negative for TTR amyloid\par

## 2025-05-27 NOTE — DISCUSSION/SUMMARY
[FreeTextEntry1] : # Hypertrophic obstructive cardiomyopathy - continue mavacamten 5 mg daily, will continue with routine screening TTE as indicated (last 3/2025, next 6/6/25) - continue high dose metoprolol, dose per Dr. Cavanaugh (currently at 300 mg a day) - indications for septal reduction therapy: none given resolution of LVOT obstruction  - last Holter or device interrogation: 11/2024 - 3 day monitor with 4 beats NSVT. will follow annually  - no indications for primary prevention ICD given lack of NSVT on Holter, no first degree family history of SCD, MWT < 3.0 cm, and no significant fibrosis on MRI - family screening and genetic testing: advised his brother be screened, previously referred to Dr. Chávez for genetic testing. will defer given advanced age  - advised against burst activity and competitive sports. advised increased hydration at home  # pAF s/p ablation 2021, 5/2025, and multiple cardioversions  - Follows with Dr. Farias - On eliquis 5 mg BID  # HTN - will allow for some permissive HTN in setting of HCM  # GI lymphoma - completed chemotherapy, reportedly with good prognosis  Return to clinic in 6 months

## 2025-05-27 NOTE — PHYSICAL EXAM
[Well Developed] : well developed [Well Nourished] : well nourished [Normal Venous Pressure] : normal venous pressure [Normal S1, S2] : normal S1, S2 [Clear Lung Fields] : clear lung fields [Good Air Entry] : good air entry [Soft] : abdomen soft [Normal Gait] : normal gait [No Edema] : no edema [Moves all extremities] : moves all extremities [No Focal Deficits] : no focal deficits [Alert and Oriented] : alert and oriented [Normal memory] : normal memory [de-identified] : no murmur at rest or with [id number] provocation  [de-identified] : Warm

## 2025-05-27 NOTE — ASSESSMENT
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  He has had improvement in symptoms and resolution of LVOT obstruction with mavacamten but still notes dyspnea when climbing an incline.

## 2025-05-27 NOTE — HISTORY OF PRESENT ILLNESS
[FreeTextEntry1] : Referring: Dr. Beata Cavanaugh EP: Dr. Farias  Mr. 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  ========  He states he diagnosed with HCM in the 1990s in the setting of fatigue at [facility]. It became apparent in the [facility] system in 2020 in the setting of rapid AF where TTE revealed basal septal hypertrophy with LVOT obstruction. He spontaneously converted to NSR and underwent PVI ablation 8/2021. He developed worsening dyspnea on exertion with LVOT obstruction and referred to the cardiomyopathy clinic 10/2022 for further management. Denies lifetime history of syncope.  Due to persistent NYHA II symptoms on max traditional medical therapy he was started on mavacamten (Camzyos) on 5/2023 and doses have been titrated. He no longer has LVOT obstruction but does have persistent NYHA II symptoms. He developed recurrent symptomatic AF and underwent DCCV 11/2023 and again 3/2024. His AF recurred 11/2024 and he underwent another DCCV and AF ablation 5/2025  Of note, he had an EGD performed for GERD which revealed lymphoma of the duodenum and completed a course of chemotherapy in 2024  ============  He presents today for followup. He still does note some dyspnea when going up an incline but can walk unlimited distances on a flat surface. Notes occasional dizziness when standing up quickly. Denies palpitations or chest pain. Denies dyspnea during household activities. Denies syncope. Overall notes a 8/10 quality of life, limited by musculoskeletal issues.

## 2025-06-03 NOTE — DISCUSSION/SUMMARY
[FreeTextEntry1] : Mr. Posadas is a pleasant 71 year-old gentleman with a known LBBB, paroxysmal atrial fibrillation and asymmetric LVH. He is s/p PVI with the cryoballoon on 8/31/21 s/p re-do ablation 5/1/25  1. Paroxysmal Atrial Fibrillation Maintaining sinus rhythm post procedure Continue Toprol XL for rate control  2. LBBB Stable.   Will continue to monitor   3. Stroke risk  Continue OAC for TE/CVA ppx  CHADS VASc at least 1   4. LVH No indication for ICD placement at this time  F/U 6 months, sooner as needed

## 2025-06-03 NOTE — HISTORY OF PRESENT ILLNESS
[FreeTextEntry1] : Mr. Posadas is a pleasant 71 year-old gentleman with a past medical history significant for possible sleep apnea, HTN, known LBBB, paroxysmal atrial fibrillation (first diagnosed 8-10 years ago), and asymmetric LVH who presents for follow-up.  He was admitted to [facility] with symptomatic AFib with RVR 11/2020.   Echo showed WON and improved LVOT gradient.  He has been maintained on Metoprolol for rate control and Amiodarone was added for rhythm management.   Historically he has been unaware of arrhythmia events.  He is now s/p PVI with the cryoballoon on 8/31/21.  Amiodarone was discontinued in December 2021. LTM 11/2022 without recurrent AF.  He had recurrent symptomatic AF, now S/P DCCV to SR 11/17/23 and again on 3/22/24. He is s/p ablation on 05/01/25: successful reisolation of PVs, posterior wall ablation, CTI ablation.    History significant for diagnosis of NH Lymphoma.  S/P chemotherapy with reportedly good prognosis.    Overall he is feeling He generally feels OK.  Denies CP, palpitations, dizziness, presyncope/syncope.  Tolerating Eliquis without bleeding issues.  Able to walk without difficulty.  Longstanding HOGUE with inclines is unchanged.

## 2025-06-03 NOTE — CARDIOLOGY SUMMARY
[de-identified] : 5/5/25: NSR @ 73 bpm with LBBB  4/14/25: NSR with LBBB, first degree AVB 10/14/24 NSR, first degree AVB, LBBB 12/18/23 Sinus Rhythm @ 68 bpm, GUILLERMINA 244 ms, LBBB 8/7/2023 SR @ 72 bpm, first deg AVB, LBBB 10/17/22 low Atrial  Rhythm @ 65 bpm, LBBB  6/20/22 SR with first deg AVB, LBBB 12/13/21 SR with first degree AVB, LBBB [de-identified] : ZioXT 1/14 - 1/28/22: SR (49-), non sustained SVT. No AFib.  [de-identified] : TTE 9/10/24 1. Limited study obtained for evaluation of LVOT gradients.  2. Severe symmetric left ventricular hypertrophy.  3. Normal left ventricular systolic function.  4. Normal right ventricular size and systolic function.  5. Mild mitral regurgitation.  6. The resting LVOT gradient is 10.00 mmHg. The LVOT gradient is 17.00 mmHg with Valsalva maneuver. There is mild mitral regurgitation.  7. Pulmonary artery systolic pressure is 31 mmHg.  8. No pericardial effusion.  9. Compared to the previous TTE performed on 7/2/2024, the LVOT gradients are slightly higher.  TTE 4/2024  1. Normal left ventricular size and systolic function.  2. There is moderate asymmetric left ventricular hypertrophy.  3. Normal right ventricular size and systolic function.  4. Severely dilated left atrium.  5. Systolic anterior motion of the mitral valve is seen without evidence of LVOT obstruction.  6. Aortic sclerosis without significant stenosis.  7. No evidence of pulmonary hypertension.  8. No pericardial effusion.  9. Compared to the previous TTE performed on 11/17/2020, left ventricular outflow tract obstruction is now present on this study.  TTE 8/2023  Probably normal LV function, peak gradient 59 mmHg with valsalva, mild MR,    TTE 3/29/22  EF 60-65%, severe asymmetric LVH, cannot rule out LVOT obstruction versus AS, ascending aorta mildly dilated,  [de-identified] : CMR 4/20/21\par  1.  The left ventricle (LV) is dilated. There is  asymmetric septal hypertrophy. Basal anteroseptum measures 2.1 cm.  Left ventricular global systolic function is normal. The LV ejection fraction is 71 %.\par  2.  The right ventricle (RV) is dilated. RV global systolic function is normal. The RV ejection fraction is 66 %.\par  3.  There is systolic anterior motion of the mitral valve. There is increased signal intensity in the left ventricular outflow tract due to increased flow turbulence. Correlate gradients with echocardiography.\par  4.  Moderate to severe MR by phase contrast imaging. Regurgitant volume is 84 mL;  regurgitant fraction is 42% . There is mild mitral regurgitation by LV-RV stroke volume.\par  5.  No significant abnormalities of the visualized portions of the great vessels.\par  6.  On delayed enhancement imaging,  there is no evidence of myocardial scarring. [de-identified] : NM 9/2022: \par  No evidence of transthyretin mediated cardiac amyloidosis was identified.  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

## 2025-06-03 NOTE — HISTORY OF PRESENT ILLNESS
[FreeTextEntry1] : Mr. Posadas is a pleasant 71 year-old gentleman with a past medical history significant for possible sleep apnea, HTN, known LBBB, paroxysmal atrial fibrillation (first diagnosed 8-10 years ago), and asymmetric LVH who presents for follow-up.  He was admitted to [facility] with symptomatic AFib with RVR 11/2020.   Echo showed WON and improved LVOT gradient.  He has been maintained on Metoprolol for rate control and Amiodarone was added for rhythm management.   Historically he has been unaware of arrhythmia events.  He is now s/p PVI with the cryoballoon on 8/31/21.  Amiodarone was discontinued in December 2021. LTM 11/2022 without recurrent AF.  He had recurrent symptomatic AF, now S/P DCCV to SR 11/17/23 and again on 3/22/24. He is s/p ablation on 05/01/25: successful reisolation of PVs, posterior wall ablation, CTI ablation.    History significant for diagnosis of NH Lymphoma.  S/P chemotherapy with reportedly good prognosis.    Overall he is feeling He generally feels OK.  Denies CP, palpitations, dizziness, presyncope/syncope.  Tolerating Eliquis without bleeding issues.  Able to walk without difficulty.  Longstanding HOGUE with inclines is unchanged.

## 2025-06-03 NOTE — CARDIOLOGY SUMMARY
[de-identified] : 5/5/25: NSR @ 73 bpm with LBBB  4/14/25: NSR with LBBB, first degree AVB 10/14/24 NSR, first degree AVB, LBBB 12/18/23 Sinus Rhythm @ 68 bpm, GUILLERMINA 244 ms, LBBB 8/7/2023 SR @ 72 bpm, first deg AVB, LBBB 10/17/22 low Atrial  Rhythm @ 65 bpm, LBBB  6/20/22 SR with first deg AVB, LBBB 12/13/21 SR with first degree AVB, LBBB [de-identified] : ZioXT 1/14 - 1/28/22: SR (49-), non sustained SVT. No AFib.  [de-identified] : TTE 9/10/24 1. Limited study obtained for evaluation of LVOT gradients.  2. Severe symmetric left ventricular hypertrophy.  3. Normal left ventricular systolic function.  4. Normal right ventricular size and systolic function.  5. Mild mitral regurgitation.  6. The resting LVOT gradient is 10.00 mmHg. The LVOT gradient is 17.00 mmHg with Valsalva maneuver. There is mild mitral regurgitation.  7. Pulmonary artery systolic pressure is 31 mmHg.  8. No pericardial effusion.  9. Compared to the previous TTE performed on 7/2/2024, the LVOT gradients are slightly higher.  TTE 4/2024  1. Normal left ventricular size and systolic function.  2. There is moderate asymmetric left ventricular hypertrophy.  3. Normal right ventricular size and systolic function.  4. Severely dilated left atrium.  5. Systolic anterior motion of the mitral valve is seen without evidence of LVOT obstruction.  6. Aortic sclerosis without significant stenosis.  7. No evidence of pulmonary hypertension.  8. No pericardial effusion.  9. Compared to the previous TTE performed on 11/17/2020, left ventricular outflow tract obstruction is now present on this study.  TTE 8/2023  Probably normal LV function, peak gradient 59 mmHg with valsalva, mild MR,    TTE 3/29/22  EF 60-65%, severe asymmetric LVH, cannot rule out LVOT obstruction versus AS, ascending aorta mildly dilated,  [de-identified] : CMR 4/20/21\par  1.  The left ventricle (LV) is dilated. There is  asymmetric septal hypertrophy. Basal anteroseptum measures 2.1 cm.  Left ventricular global systolic function is normal. The LV ejection fraction is 71 %.\par  2.  The right ventricle (RV) is dilated. RV global systolic function is normal. The RV ejection fraction is 66 %.\par  3.  There is systolic anterior motion of the mitral valve. There is increased signal intensity in the left ventricular outflow tract due to increased flow turbulence. Correlate gradients with echocardiography.\par  4.  Moderate to severe MR by phase contrast imaging. Regurgitant volume is 84 mL;  regurgitant fraction is 42% . There is mild mitral regurgitation by LV-RV stroke volume.\par  5.  No significant abnormalities of the visualized portions of the great vessels.\par  6.  On delayed enhancement imaging,  there is no evidence of myocardial scarring. [de-identified] : NM 9/2022: \par  No evidence of transthyretin mediated cardiac amyloidosis was identified.  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

## 2025-07-02 NOTE — ASSESSMENT
[FreeTextEntry1] : 71 Y OLD MALE WITH PMX OF NHL;CARIOMYOPATHY,IFG ,HTN ,DYSLIPIDEMIA ,BPH ,OBESITY ,SHORT TERM MEMORY IMPAIRMENT = LABS ORDERED RTO 3 M

## 2025-07-02 NOTE — HISTORY OF PRESENT ILLNESS
[de-identified] : SEEN BY CARDIO 2 DAYS AGO  WILL HAVE COLONOSCOPY 7/14 AND WAS TOLD HE NEEDS TO HAVE MAGNESIUM LEVEL RESUL;TS

## 2025-07-02 NOTE — PHYSICAL EXAM
[No Acute Distress] : no acute distress [Normal Sclera/Conjunctiva] : normal sclera/conjunctiva [Normal Outer Ear/Nose] : the outer ears and nose were normal in appearance [No JVD] : no jugular venous distention [No Respiratory Distress] : no respiratory distress  [No Accessory Muscle Use] : no accessory muscle use [Normal Rate] : normal rate  [___ +] : bilateral [unfilled]U+ pitting edema to the ankles [Obese] : obese [Normal] : normal [Soft, Nontender] : the abdomen was soft and nontender [No Mass] : no masses were palpated [No HSM] : no hepatosplenomegaly noted [No Focal Deficits] : no focal deficits [Normal Affect] : the affect was normal

## 2025-07-02 NOTE — REVIEW OF SYSTEMS
[Recent Change In Weight] : ~T recent weight change [Lower Ext Edema] : lower extremity edema [Negative] : Heme/Lymph